# Patient Record
Sex: MALE | Race: WHITE | Employment: OTHER | URBAN - METROPOLITAN AREA
[De-identification: names, ages, dates, MRNs, and addresses within clinical notes are randomized per-mention and may not be internally consistent; named-entity substitution may affect disease eponyms.]

---

## 2017-03-07 ENCOUNTER — OFFICE VISIT (OUTPATIENT)
Dept: INTERNAL MEDICINE CLINIC | Age: 82
End: 2017-03-07

## 2017-03-07 ENCOUNTER — HOSPITAL ENCOUNTER (OUTPATIENT)
Dept: LAB | Age: 82
Discharge: HOME | End: 2017-03-07
Payer: MEDICARE

## 2017-03-07 VITALS
DIASTOLIC BLOOD PRESSURE: 80 MMHG | BODY MASS INDEX: 28.73 KG/M2 | SYSTOLIC BLOOD PRESSURE: 134 MMHG | HEIGHT: 69 IN | HEART RATE: 88 BPM | WEIGHT: 194 LBS | OXYGEN SATURATION: 99 % | RESPIRATION RATE: 16 BRPM | TEMPERATURE: 99.2 F

## 2017-03-07 DIAGNOSIS — I10 ESSENTIAL HYPERTENSION: ICD-10-CM

## 2017-03-07 DIAGNOSIS — E78.5 DYSLIPIDEMIA: ICD-10-CM

## 2017-03-07 DIAGNOSIS — Z00.00 ROUTINE GENERAL MEDICAL EXAMINATION AT A HEALTH CARE FACILITY: Primary | ICD-10-CM

## 2017-03-07 DIAGNOSIS — F32.A DEPRESSIVE DISORDER: ICD-10-CM

## 2017-03-07 LAB
ALBUMIN SERPL BCP-MCNC: 4.1 G/DL (ref 3.4–5)
ALBUMIN/GLOB SERPL: 1.5 {RATIO} (ref 0.8–1.7)
ALP SERPL-CCNC: 71 U/L (ref 45–117)
ALT SERPL-CCNC: 19 U/L (ref 16–61)
ANION GAP BLD CALC-SCNC: 8 MMOL/L (ref 3–18)
AST SERPL W P-5'-P-CCNC: 15 U/L (ref 15–37)
BASOPHILS # BLD AUTO: 0 K/UL (ref 0–0.06)
BASOPHILS # BLD: 0 % (ref 0–2)
BILIRUB SERPL-MCNC: 0.9 MG/DL (ref 0.2–1)
BUN SERPL-MCNC: 27 MG/DL (ref 7–18)
BUN/CREAT SERPL: 18 (ref 12–20)
CALCIUM SERPL-MCNC: 8.8 MG/DL (ref 8.5–10.1)
CHLORIDE SERPL-SCNC: 105 MMOL/L (ref 100–108)
CHOLEST SERPL-MCNC: 146 MG/DL
CO2 SERPL-SCNC: 28 MMOL/L (ref 21–32)
CREAT SERPL-MCNC: 1.5 MG/DL (ref 0.6–1.3)
DIFFERENTIAL METHOD BLD: ABNORMAL
EOSINOPHIL # BLD: 0 K/UL (ref 0–0.4)
EOSINOPHIL NFR BLD: 0 % (ref 0–5)
ERYTHROCYTE [DISTWIDTH] IN BLOOD BY AUTOMATED COUNT: 13.2 % (ref 11.6–14.5)
GLOBULIN SER CALC-MCNC: 2.8 G/DL (ref 2–4)
GLUCOSE SERPL-MCNC: 91 MG/DL (ref 74–99)
HCT VFR BLD AUTO: 41.9 % (ref 36–48)
HDLC SERPL-MCNC: 54 MG/DL (ref 40–60)
HDLC SERPL: 2.7 {RATIO} (ref 0–5)
HGB BLD-MCNC: 13.6 G/DL (ref 13–16)
LDLC SERPL CALC-MCNC: 73.2 MG/DL (ref 0–100)
LIPID PROFILE,FLP: NORMAL
LYMPHOCYTES # BLD AUTO: 15 % (ref 21–52)
LYMPHOCYTES # BLD: 1.2 K/UL (ref 0.9–3.6)
MCH RBC QN AUTO: 30.7 PG (ref 24–34)
MCHC RBC AUTO-ENTMCNC: 32.5 G/DL (ref 31–37)
MCV RBC AUTO: 94.6 FL (ref 74–97)
MONOCYTES # BLD: 0.3 K/UL (ref 0.05–1.2)
MONOCYTES NFR BLD AUTO: 4 % (ref 3–10)
NEUTS SEG # BLD: 6.4 K/UL (ref 1.8–8)
NEUTS SEG NFR BLD AUTO: 81 % (ref 40–73)
PLATELET # BLD AUTO: 95 K/UL (ref 135–420)
PMV BLD AUTO: 12.3 FL (ref 9.2–11.8)
POTASSIUM SERPL-SCNC: 3.6 MMOL/L (ref 3.5–5.5)
PROT SERPL-MCNC: 6.9 G/DL (ref 6.4–8.2)
RBC # BLD AUTO: 4.43 M/UL (ref 4.7–5.5)
SODIUM SERPL-SCNC: 141 MMOL/L (ref 136–145)
TRIGL SERPL-MCNC: 94 MG/DL (ref ?–150)
VLDLC SERPL CALC-MCNC: 18.8 MG/DL
WBC # BLD AUTO: 7.9 K/UL (ref 4.6–13.2)

## 2017-03-07 PROCEDURE — 80061 LIPID PANEL: CPT | Performed by: INTERNAL MEDICINE

## 2017-03-07 PROCEDURE — 85025 COMPLETE CBC W/AUTO DIFF WBC: CPT | Performed by: INTERNAL MEDICINE

## 2017-03-07 PROCEDURE — 80053 COMPREHEN METABOLIC PANEL: CPT | Performed by: INTERNAL MEDICINE

## 2017-03-07 RX ORDER — CITALOPRAM 20 MG/1
TABLET, FILM COATED ORAL DAILY
COMMUNITY

## 2017-03-07 RX ORDER — METOPROLOL TARTRATE 25 MG/1
TABLET, FILM COATED ORAL 2 TIMES DAILY
COMMUNITY

## 2017-03-07 NOTE — PROGRESS NOTES
1. Have you been to the ER, urgent care clinic since your last visit? Hospitalized since your last visit? No    2. Have you seen or consulted any other health care providers outside of the [address] [place] [address] since your last visit? Include any pap smears or colon screening.  No

## 2017-03-07 NOTE — PATIENT INSTRUCTIONS
DASH Diet: Care Instructions  Your Care Instructions  The DASH diet is an eating plan that can help lower your blood pressure. DASH stands for Dietary Approaches to Stop Hypertension. Hypertension is high blood pressure. The DASH diet focuses on eating foods that are high in calcium, potassium, and magnesium. These nutrients can lower blood pressure. The foods that are highest in these nutrients are fruits, vegetables, low-fat dairy products, nuts, seeds, and legumes. But taking calcium, potassium, and magnesium supplements instead of eating foods that are high in those nutrients does not have the same effect. The DASH diet also includes whole grains, fish, and poultry. The DASH diet is one of several lifestyle changes your doctor may recommend to lower your high blood pressure. Your doctor may also want you to decrease the amount of sodium in your diet. Lowering sodium while following the DASH diet can lower blood pressure even further than just the DASH diet alone. Follow-up care is a key part of your treatment and safety. Be sure to make and go to all appointments, and call your doctor if you are having problems. It's also a good idea to know your test results and keep a list of the medicines you take. How can you care for yourself at home? Following the DASH diet  · Eat 4 to 5 servings of fruit each day. A serving is 1 medium-sized piece of fruit, ½ cup chopped or canned fruit, 1/4 cup dried fruit, or 4 ounces (½ cup) of fruit juice. Choose fruit more often than fruit juice. · Eat 4 to 5 servings of vegetables each day. A serving is 1 cup of lettuce or raw leafy vegetables, ½ cup of chopped or cooked vegetables, or 4 ounces (½ cup) of vegetable juice. Choose vegetables more often than vegetable juice. · Get 2 to 3 servings of low-fat and fat-free dairy each day. A serving is 8 ounces of milk, 1 cup of yogurt, or 1 ½ ounces of cheese. · Eat 6 to 8 servings of grains each day.  A serving is 1 slice of bread, 1 ounce of dry cereal, or ½ cup of cooked rice, pasta, or cooked cereal. Try to choose whole-grain products as much as possible. · Limit lean meat, poultry, and fish to 2 servings each day. A serving is 3 ounces, about the size of a deck of cards. · Eat 4 to 5 servings of nuts, seeds, and legumes (cooked dried beans, lentils, and split peas) each week. A serving is 1/3 cup of nuts, 2 tablespoons of seeds, or ½ cup of cooked beans or peas. · Limit fats and oils to 2 to 3 servings each day. A serving is 1 teaspoon of vegetable oil or 2 tablespoons of salad dressing. · Limit sweets and added sugars to 5 servings or less a week. A serving is 1 tablespoon jelly or jam, ½ cup sorbet, or 1 cup of lemonade. · Eat less than 2,300 milligrams (mg) of sodium a day. If you limit your sodium to 1,500 mg a day, you can lower your blood pressure even more. Tips for success  · Start small. Do not try to make dramatic changes to your diet all at once. You might feel that you are missing out on your favorite foods and then be more likely to not follow the plan. Make small changes, and stick with them. Once those changes become habit, add a few more changes. · Try some of the following:  ¨ Make it a goal to eat a fruit or vegetable at every meal and at snacks. This will make it easy to get the recommended amount of fruits and vegetables each day. ¨ Try yogurt topped with fruit and nuts for a snack or healthy dessert. ¨ Add lettuce, tomato, cucumber, and onion to sandwiches. ¨ Combine a ready-made pizza crust with low-fat mozzarella cheese and lots of vegetable toppings. Try using tomatoes, squash, spinach, broccoli, carrots, cauliflower, and onions. ¨ Have a variety of cut-up vegetables with a low-fat dip as an appetizer instead of chips and dip. ¨ Sprinkle sunflower seeds or chopped almonds over salads. Or try adding chopped walnuts or almonds to cooked vegetables. ¨ Try some vegetarian meals using beans and peas. Add garbanzo or kidney beans to salads. Make burritos and tacos with mashed ewing beans or black beans. Where can you learn more? Go to http://leela-estela.info/. Enter B249 in the search box to learn more about \"DASH Diet: Care Instructions. \"  Current as of: March 23, 2016  Content Version: 11.1  © 4873-6246 [employer]. Care instructions adapted under license by [employer] (which disclaims liability or warranty for this information). If you have questions about a medical condition or this instruction, always ask your healthcare professional. [place] [address] any warranty or liability for your use of this information. Medicare Wellness Visit, Male    The best way to live healthy is to have a healthy lifestyle by eating a well-balanced diet, exercising regularly, limiting alcohol and stopping smoking. Regular physical exams and screening tests are another way to keep healthy. Preventive exams provided by your health care provider can find health problems before they become diseases or illnesses. Preventive services including immunizations, screening tests, monitoring and exams can help you take care of your own health. All people over age 72 should have a pneumovax  and and a prevnar shot to prevent pneumonia. These are once in a lifetime unless you and your provider decide differently. All people over 65 should have a yearly flu shot and a tetanus vaccine every 10 years. Screening for diabetes mellitus with a blood sugar test should be done every year. Glaucoma is a disease of the eye due to increased ocular pressure that can lead to blindness and it should be done every year by an eye professional.    Cardiovascular screening tests that check for elevated lipids (fatty part of blood) which can lead to heart disease and strokes should be done every 5 years.     Colorectal screening that evaluates for blood or polyps in your colon should be done yearly as a stool test or every five years as a flexible sigmoidoscope or every 10 years as a colonoscopy up to age 76. Men up to age 76 may need a screening blood test for prostate cancer at certain intervals, depending on their personal and family history. This decision is between the patient and his provider. If you have been a smoker or had family history of abdominal aortic aneurysms, you and your provider may decide to schedule an ultrasound test of your aorta. Hepatitis C screening is also recommended for anyone born between [birth year] through [address]. A shingles vaccine is also recommended once in a lifetime after age 61. Your Medicare Wellness Exam is recommended annually.     Here is a list of your current Health Maintenance items with a due date:  Health Maintenance Due   Topic Date Due    DTaP/Tdap/Td  (1 - Tdap) [birth year]/1953    Shingles Vaccine  04/11/1992    Glaucoma Screening   04/11/1997    Annual Well Visit  11/17/2016

## 2017-03-07 NOTE — MR AVS SNAPSHOT
Visit Information Date & Time Provider Department Dept. Phone Encounter #  
 3/7/2017 12:00 PM Tony Kelly MD [facility] INTERNAL MEDICINE OF [place] [phone] [id number] Follow-up Instructions Return if symptoms worsen or fail to improve. Follow-up and Disposition History Your Appointments 9/8/2017 12:00 PM  
Follow Up with Tony Kelly MD  
[facility] INTERNAL MEDICINE OF [place] [address] [facility]) Appt Note: 6 mo f/u  
 [address], [address] [facility] [address]  
  
   
 [address] [facility], [address] [facility] [address] Upcoming Health Maintenance Date Due DTaP/Tdap/Td series (1 - Tdap) 4[birth year]11/1953 ZOSTER VACCINE AGE 60> 4/11/1992 GLAUCOMA SCREENING Q2Y 4/11/1997 MEDICARE YEARLY EXAM 3/8/2018 Allergies as of 3/7/2017  Review Complete On: 3/7/2017 By: Tony Kelly MD  
  
 Severity Noted Reaction Type Reactions Sulfa (Sulfonamide Antibiotics)  01/27/2011    Other (comments) Current Immunizations  Reviewed on 2/4/2016 Name Date Influenza High Dose Vaccine PF 9/9/2016, 9/18/2015 Pneumococcal Conjugate (PCV-13) 1/2/2003 Pneumococcal Vaccine (Unspecified Type) 1/12/2012 Not reviewed this visit You Were Diagnosed With   
  
 Codes Comments Routine general medical examination at a health care facility    -  Primary ICD-10-CM: Z00.00 ICD-9-CM: V70.0 Essential hypertension     ICD-10-CM: I10 
ICD-9-CM: 401.9 Dyslipidemia     ICD-10-CM: E78.5 ICD-9-CM: 272.4 Depressive disorder     ICD-10-CM: F32.9 ICD-9-CM: 686 Vitals BP Pulse Temp Resp Height(growth percentile) Weight(growth percentile) 134/80 88 99.2 °F (37.3 °C) (Tympanic) 16 5' 9\" (1.753 m) 194 lb (88 kg) SpO2 BMI Smoking Status 99% 28.65 kg/m2 Former Smoker Vitals History BMI and BSA Data  Body Mass Index Body Surface Area  
 28.65 kg/m 2 2.07 m 2  
  
  
 Preferred Pharmacy Pharmacy Name Phone [id number] Daniel [phone] Your Updated Medication List  
  
   
This list is accurate as of: 3/7/17 12:26 PM.  Always use your most recent med list.  
  
  
  
  
 aspirin delayed-release 81 mg tablet Take 1 Tab by mouth daily. CeleXA 20 mg tablet Generic drug:  citalopram  
Take  by mouth daily. fluticasone 50 mcg/actuation nasal spray Commonly known as:  FLONASE  
use in each nostril  
  
 guaiFENesin-codeine 100-10 mg/5 mL solution Commonly known as:  CHERATUSSIN AC  
1 or 2 tsp q 6 hrs prn cough  
  
 losartan-hydroCHLOROthiazide 50-12.5 mg per tablet Commonly known as:  HYZAAR Take 1 Tab by mouth daily. meclizine 25 mg tablet Commonly known as:  ANTIVERT Take 1 Tab by mouth three (3) times daily as needed for Dizziness. metoprolol tartrate 25 mg tablet Commonly known as:  LOPRESSOR Take  by mouth two (2) times a day. simvastatin 20 mg tablet Commonly known as:  ZOCOR Take 1 Tab by mouth nightly. traMADol 50 mg tablet Commonly known as:  ULTRAM  
1 or 2 po tid prn pain XALATAN 0.005 % ophthalmic solution Generic drug:  latanoprost  
Administer 1 Drop to both eyes nightly. Follow-up Instructions Return if symptoms worsen or fail to improve. Patient Instructions DASH Diet: Care Instructions Your Care Instructions The DASH diet is an eating plan that can help lower your blood pressure. DASH stands for Dietary Approaches to Stop Hypertension. Hypertension is high blood pressure. The DASH diet focuses on eating foods that are high in calcium, potassium, and magnesium. These nutrients can lower blood pressure. The foods that are highest in these nutrients are fruits, vegetables, low-fat dairy products, nuts, seeds, and legumes.  But taking calcium, potassium, and magnesium supplements instead of eating foods that are high in those nutrients does not have the same effect. The DASH diet also includes whole grains, fish, and poultry. The DASH diet is one of several lifestyle changes your doctor may recommend to lower your high blood pressure. Your doctor may also want you to decrease the amount of sodium in your diet. Lowering sodium while following the DASH diet can lower blood pressure even further than just the DASH diet alone. Follow-up care is a key part of your treatment and safety. Be sure to make and go to all appointments, and call your doctor if you are having problems. It's also a good idea to know your test results and keep a list of the medicines you take. How can you care for yourself at home? Following the DASH diet · Eat 4 to 5 servings of fruit each day. A serving is 1 medium-sized piece of fruit, ½ cup chopped or canned fruit, 1/4 cup dried fruit, or 4 ounces (½ cup) of fruit juice. Choose fruit more often than fruit juice. · Eat 4 to 5 servings of vegetables each day. A serving is 1 cup of lettuce or raw leafy vegetables, ½ cup of chopped or cooked vegetables, or 4 ounces (½ cup) of vegetable juice. Choose vegetables more often than vegetable juice. · Get 2 to 3 servings of low-fat and fat-free dairy each day. A serving is 8 ounces of milk, 1 cup of yogurt, or 1 ½ ounces of cheese. · Eat 6 to 8 servings of grains each day. A serving is 1 slice of bread, 1 ounce of dry cereal, or ½ cup of cooked rice, pasta, or cooked cereal. Try to choose whole-grain products as much as possible. · Limit lean meat, poultry, and fish to 2 servings each day. A serving is 3 ounces, about the size of a deck of cards. · Eat 4 to 5 servings of nuts, seeds, and legumes (cooked dried beans, lentils, and split peas) each week. A serving is 1/3 cup of nuts, 2 tablespoons of seeds, or ½ cup of cooked beans or peas. · Limit fats and oils to 2 to 3 servings each day. A serving is 1 teaspoon of vegetable oil or 2 tablespoons of salad dressing. · Limit sweets and added sugars to 5 servings or less a week. A serving is 1 tablespoon jelly or jam, ½ cup sorbet, or 1 cup of lemonade. · Eat less than 2,300 milligrams (mg) of sodium a day. If you limit your sodium to 1,500 mg a day, you can lower your blood pressure even more. Tips for success · Start small. Do not try to make dramatic changes to your diet all at once. You might feel that you are missing out on your favorite foods and then be more likely to not follow the plan. Make small changes, and stick with them. Once those changes become habit, add a few more changes. · Try some of the following: ¨ Make it a goal to eat a fruit or vegetable at every meal and at snacks. This will make it easy to get the recommended amount of fruits and vegetables each day. ¨ Try yogurt topped with fruit and nuts for a snack or healthy dessert. ¨ Add lettuce, tomato, cucumber, and onion to sandwiches. ¨ Combine a ready-made pizza crust with low-fat mozzarella cheese and lots of vegetable toppings. Try using tomatoes, squash, spinach, broccoli, carrots, cauliflower, and onions. ¨ Have a variety of cut-up vegetables with a low-fat dip as an appetizer instead of chips and dip. ¨ Sprinkle sunflower seeds or chopped almonds over salads. Or try adding chopped walnuts or almonds to cooked vegetables. ¨ Try some vegetarian meals using beans and peas. Add garbanzo or kidney beans to salads. Make burritos and tacos with mashed ewing beans or black beans. Where can you learn more? Go to http://leela-estela.info/. Enter C823 in the search box to learn more about \"DASH Diet: Care Instructions. \" Current as of: March 23, 2016 Content Version: 11.1 © 9824-4293 [employer].  Care instructions adapted under license by [address] (which disclaims liability or warranty for this information). If you have questions about a medical condition or this instruction, always ask your healthcare professional. [address] any warranty or liability for your use of this information. Medicare Wellness Visit, Male The best way to live healthy is to have a healthy lifestyle by eating a well-balanced diet, exercising regularly, limiting alcohol and stopping smoking. Regular physical exams and screening tests are another way to keep healthy. Preventive exams provided by your health care provider can find health problems before they become diseases or illnesses. Preventive services including immunizations, screening tests, monitoring and exams can help you take care of your own health. All people over age 72 should have a pneumovax  and and a prevnar shot to prevent pneumonia. These are once in a lifetime unless you and your provider decide differently. All people over 65 should have a yearly flu shot and a tetanus vaccine every 10 years. Screening for diabetes mellitus with a blood sugar test should be done every year. Glaucoma is a disease of the eye due to increased ocular pressure that can lead to blindness and it should be done every year by an eye professional. 
 
Cardiovascular screening tests that check for elevated lipids (fatty part of blood) which can lead to heart disease and strokes should be done every 5 years. Colorectal screening that evaluates for blood or polyps in your colon should be done yearly as a stool test or every five years as a flexible sigmoidoscope or every 10 years as a colonoscopy up to age 76. Men up to age 76 may need a screening blood test for prostate cancer at certain intervals, depending on their personal and family history. This decision is between the patient and his provider. If you have been a smoker or had family history of abdominal aortic aneurysms, you and your provider may decide to schedule an ultrasound test of your aorta. Hepatitis C screening is also recommended for anyone born between [birth year] through [address]. A shingles vaccine is also recommended once in a lifetime after age 61. Your Medicare Wellness Exam is recommended annually. Here is a list of your current Health Maintenance items with a due date: 
Health Maintenance Due Topic Date Due  
 DTaP/Tdap/Td  (1 - Tdap) 04[birth year]11/1953  Shingles Vaccine  04/11/1992  Glaucoma Screening   04/11/1997 [facility] Annual Well Visit  11/17/2016 Patient Instructions History Introducing [address]! Nikita Garcia introduces [employer] patient portal. Now you can access parts of your medical record, email your doctor's office, and request medication refills online. 1. In your internet browser, go to [email or web address] 2. Click on the First Time User? Click Here link in the Sign In box. You will see the New Member Sign Up page. 3. Enter your [employer] Access Code exactly as it appears below. You will not need to use this code after youve completed the sign-up process. If you do not sign up before the expiration date, you must request a new code. · [employer] Access Code: [email or web address] Expires: 6/5/2017 12:26 PM 
 
4. Enter the last four digits of your Social Security Number (xxxx) and Date of Birth (mm/dd/yyyy) as indicated and click Submit. You will be taken to the next sign-up page. 5. Create a [employer] ID. This will be your [employer] login ID and cannot be changed, so think of one that is secure and easy to remember. 6. Create a [employer] password. You can change your password at any time. 7. Enter your Password Reset Question and Answer. This can be used at a later time if you forget your password. 8. Enter your e-mail address.  You will receive e-mail notification when new information is available in [employer]. 9. Click Sign Up. You can now view and download portions of your medical record. 10. Click the Download Summary menu link to download a portable copy of your medical information. If you have questions, please visit the Frequently Asked Questions section of the [employer] website. Remember, [employer] is NOT to be used for urgent needs. For medical emergencies, dial 911. Now available from your iPhone and Android! Please provide this summary of care documentation to your next provider. Your primary care clinician is listed as Aleksandra Tadeo. If you have any questions after today's visit, please call [phone].

## 2017-03-07 NOTE — PROGRESS NOTES
This is a Subsequent Medicare Annual Wellness Visit providing Personalized Prevention Plan Services (PPPS) (Performed 12 months after initial AWV and PPPS )    I have reviewed the patient's medical history in detail and updated the computerized patient record. History     Past Medical History:   Diagnosis Date    Abnormal myocardial perfusion study 04/12/2012    Small, mild mid to distal anterior septal defect concerning for ischemia. Mild mid to distal anterior septal hypk. EF 61%. Neg EKG on pharm stress test.  Low to intermediate risk.  Arthritis     CAD (coronary artery disease) December 2009    presented with anterior wall STEMI with subsequent 100% proximal LAD thrombotic lesion stented to residual 0% using 3.5-mm Cypher stent (13-mm length). He only had mild disease in his left main, RCA, and circumflex coronary artery.  Chronic kidney disease     Depressive disorder     Dyslipidemia     Gout     History of echocardiogram 06/25/2013    EF 55-60%. No RWMA. Normal diastolic function. Sm pericardial effusion (also noted on echo 4/26/10), without tamponade physiology.  Hypercoagulable state ([address]     DVT x 2 (L) 2/2014     Hypertension     Hypertrophy (benign) of prostate     Lower extremity venous duplex 07/05/2013    Right leg:  Acute, occlusive DVT in posterior tibial vein. Pulsatile flow.  Neuropathy     Normal ankle brachial index 01/16/2012    No significant peripheral arterial disease bilaterally. R BHANU 1.31.  L BHANU 1.31.    Pacemaker 04/27/10    Implantation of dual-chamber Medtronic pacemaker    S/P cardiac cath 12/28/2009    oRCA 30%. LM patent. CX patent. LAD p100% thrombotic (Pronto thrombectomy, 3.5 x 13 Cypher stent). LVEDP 26.  EF 60%. Min anteroapical hypk.       Sinus bradycardia     pacemaker in 2010    Thromboembolus [facility]) 2011    h/o DVT x2 to LLE during surgery for Right TKR    Venous (peripheral) insufficiency       Past Surgical History: Procedure Laterality Date    HX COLONOSCOPY  11/2004    neg    HX HEART CATHETERIZATION  12/28/09    LAD stent for AMI 3.5mm Cypher stent (13mm length)    HX ORTHOPAEDIC      shoulder manipulation    HX ORTHOPAEDIC  6/2013    right knee replacement    HX PACEMAKER  04/27/10    Implantation of dual-chamber Medtronic pacemaker     Current Outpatient Prescriptions   Medication Sig Dispense Refill    citalopram (CELEXA) 20 mg tablet Take  by mouth daily.  metoprolol tartrate (LOPRESSOR) 25 mg tablet Take  by mouth two (2) times a day.  guaiFENesin-codeine (CHERATUSSIN AC) 100-10 mg/5 mL solution 1 or 2 tsp q 6 hrs prn cough 120 mL 1    losartan-hydrochlorothiazide (HYZAAR) 50-12.5 mg per tablet Take 1 Tab by mouth daily. 30 Tab 10    traMADol (ULTRAM) 50 mg tablet 1 or 2 po tid prn pain 40 Tab 1    aspirin delayed-release 81 mg tablet Take 1 Tab by mouth daily. 30 Tab 0    fluticasone (FLONASE) 50 mcg/actuation nasal spray use in each nostril (Patient taking differently: 2 Sprays by Both Nostrils route daily as needed. use in each nostril) 1 Bottle 0    meclizine (ANTIVERT) 25 mg tablet Take 1 Tab by mouth three (3) times daily as needed for Dizziness. 30 Tab 0    simvastatin (ZOCOR) 20 mg tablet Take 1 Tab by mouth nightly. 90 Tab 3    latanoprost (XALATAN) 0.005 % ophthalmic solution Administer 1 Drop to both eyes nightly.        Allergies   Allergen Reactions    Sulfa (Sulfonamide Antibiotics) Other (comments)     Family History   Problem Relation Age of Onset    Arthritis-osteo Father      Social History   Substance Use Topics    Smoking status: Former Smoker     Packs/day: 1.00     Quit date: 5/21/1955    Smokeless tobacco: Never Used    Alcohol use No     Patient Active Problem List   Diagnosis Code    CAD (coronary artery disease) I25.10    Sinus bradycardia R00.1    Gout 200    Hypertension I10    S/P cardiac catheterization Z98.890    Dyslipidemia E78.5    Pacemaker Z95.0    Old myocardial infarction I25.2    Hypertrophy (benign) of prostate N40.0    Osteoarthrosis involving multiple sites M15.9    Depressive disorder F32.9    Chronic kidney disease N18.9    Venous (peripheral) insufficiency I87.2    Hypercoagulable state ([facility] [address] D68.59    Advance directive discussed with patient Z70.80    Syncope R55    Orthostatic dizziness R42    Fall at home W19. [place], Y92.099       Depression Risk Factor Screening:   No flowsheet data found. Alcohol Risk Factor Screening: On any occasion during the past 3 months, have you had more than 4 drinks containing alcohol? No    Do you average more than 14 drinks per week? No      Functional Ability and Level of Safety:     Hearing Loss   moderate    Activities of Daily Living   Partial assistance. Requires assistance with: ambulation    Fall Risk     Fall Risk Assessment, last 12 mths 7/8/2016   Able to walk? Yes   Fall in past 12 months? No   Fall with injury? -   Number of falls in past 12 months -   Fall Risk Score -     Abuse Screen   Patient is not abused    Review of Systems   A comprehensive review of systems was negative except for that written in the HPI. Physical Examination     Evaluation of Cognitive Function:  Mood/affect:  neutral  Appearance: age appropriate  Family member/caregiver input: none    heavy WN in NAD  Visit Vitals    /80    Pulse 88    Temp 99.2 °F (37.3 °C) (Tympanic)    Resp 16    Ht 5' 9\" (1.753 m)    Wt 194 lb (88 kg)    SpO2 99%    BMI 28.65 kg/m2     OP: clear  Neck: supple w/o mass or bruits  Chest: clear  CV: RRR w/o m,r,g; pulses NP distal legs  Abd: +BS, soft, NT w/o mass or HSM  Ext: tr edema  Neuro: NF      Patient Care Team:  Genoveva Cavazos MD as PCP - General (Internal Medicine)    Advice/Referrals/Counseling   Education and counseling provided:  Are appropriate based on today's review and evaluation      Assessment/Plan     Encounter Diagnoses   Name Primary?     Routine general medical examination at a health care facility Yes    Essential hypertension     Dyslipidemia     Depressive disorder    Medicare wellness performed  Labs soon to assess metabolic parameters  Continue dietary/exercise efforts  Vaccines: flu vaccine already given  Advance directive discussed    PROGRESS NOTE  HPI:   Routine f/u of HTN, hyperlipidemia, depression (mild on rx)  w/o chest pain/abd. discomfort; no increased dyspnea, cough or pedal edema; denies constitutional complaints of fever, night sweats or wt loss; no evidence of GI/ hemorrhage; no polyuria/polydipsia. Activity is sedentary d/t OA pain    ROS is otherwise negative. Past Medical History:   Diagnosis Date    Abnormal myocardial perfusion study 04/12/2012    Small, mild mid to distal anterior septal defect concerning for ischemia. Mild mid to distal anterior septal hypk. EF 61%. Neg EKG on pharm stress test.  Low to intermediate risk.  Arthritis     CAD (coronary artery disease) December 2009    presented with anterior wall STEMI with subsequent 100% proximal LAD thrombotic lesion stented to residual 0% using 3.5-mm Cypher stent (13-mm length). He only had mild disease in his left main, RCA, and circumflex coronary artery.  Chronic kidney disease     Depressive disorder     Dyslipidemia     Gout     History of echocardiogram 06/25/2013    EF 55-60%. No RWMA. Normal diastolic function. Sm pericardial effusion (also noted on echo 4/26/10), without tamponade physiology.  Hypercoagulable state ([address]     DVT x 2 (L) 2/2014     Hypertension     Hypertrophy (benign) of prostate     Lower extremity venous duplex 07/05/2013    Right leg:  Acute, occlusive DVT in posterior tibial vein. Pulsatile flow.  Neuropathy     Normal ankle brachial index 01/16/2012    No significant peripheral arterial disease bilaterally.   R BHANU 1.31.  L BHANU 1.31.    Pacemaker 04/27/10    Implantation of dual-chamber Medtronic pacemaker    S/P cardiac cath 12/28/2009    oRCA 30%. LM patent. CX patent. LAD p100% thrombotic (Pronto thrombectomy, 3.5 x 13 Cypher stent). LVEDP 26.  EF 60%. Min anteroapical hypk.  Sinus bradycardia     pacemaker in 2010    Thromboembolus [facility]) 2011    h/o DVT x2 to LLE during surgery for Right TKR    Venous (peripheral) insufficiency        Past Surgical History:   Procedure Laterality Date    HX COLONOSCOPY  11/2004    neg    HX HEART CATHETERIZATION  12/28/09    LAD stent for AMI 3.5mm Cypher stent (13mm length)    HX ORTHOPAEDIC      shoulder manipulation    HX ORTHOPAEDIC  6/2013    right knee replacement    HX PACEMAKER  04/27/10    Implantation of dual-chamber Medtronic pacemaker       Social History     Social History    Marital status:      Spouse name: N/A    Number of children: N/A    Years of education: N/A     Occupational History    retired wood worker      Social History Main Topics    Smoking status: Former Smoker     Packs/day: 1.00     Quit date: 5/21/1955    Smokeless tobacco: Never Used    Alcohol use No    Drug use: No    Sexual activity: Yes     Partners: Female     Birth control/ protection: None     Other Topics Concern    Not on file     Social History Narrative       Allergies   Allergen Reactions    Sulfa (Sulfonamide Antibiotics) Other (comments)       Family History   Problem Relation Age of Onset    Arthritis-osteo Father        Current Outpatient Prescriptions   Medication Sig Dispense Refill    citalopram (CELEXA) 20 mg tablet Take  by mouth daily.  metoprolol tartrate (LOPRESSOR) 25 mg tablet Take  by mouth two (2) times a day.  guaiFENesin-codeine (CHERATUSSIN AC) 100-10 mg/5 mL solution 1 or 2 tsp q 6 hrs prn cough 120 mL 1    losartan-hydrochlorothiazide (HYZAAR) 50-12.5 mg per tablet Take 1 Tab by mouth daily.  30 Tab 10    traMADol (ULTRAM) 50 mg tablet 1 or 2 po tid prn pain 40 Tab 1    aspirin delayed-release 81 mg tablet Take 1 Tab by mouth daily. 30 Tab 0    fluticasone (FLONASE) 50 mcg/actuation nasal spray use in each nostril (Patient taking differently: 2 Sprays by Both Nostrils route daily as needed. use in each nostril) 1 Bottle 0    meclizine (ANTIVERT) 25 mg tablet Take 1 Tab by mouth three (3) times daily as needed for Dizziness. 30 Tab 0    simvastatin (ZOCOR) 20 mg tablet Take 1 Tab by mouth nightly. 90 Tab 3    latanoprost (XALATAN) 0.005 % ophthalmic solution Administer 1 Drop to both eyes nightly. Visit Vitals    /80    Pulse 88    Temp 99.2 °F (37.3 °C) (Tympanic)    Resp 16    Ht 5' 9\" (1.753 m)    Wt 194 lb (88 kg)    SpO2 99%    BMI 28.65 kg/m2       PE  Well nourished in NAD  HEENT:  OP: clear. Neck: supple w/o mass or bruits. Chest: clear. CV: RRR w/o m,r,g; pulses NP distal legs  Abd: soft, NT, w/o HSM or mass. Ext: tr edema. Neuro: NF. Assessment and Plan    Encounter Diagnoses   Name Primary?     Routine general medical examination at a health care facility Yes    Essential hypertension     Dyslipidemia     Depressive disorder    HTN - controlled  Hyperlipidemia - labs soon to assess metabolic parameters  Depression - mild sxs on celexa  No change in rx  OV 6 mos or prn  I have explained plan to patient and the patient verbalizes understanding

## 2017-05-29 ENCOUNTER — APPOINTMENT (OUTPATIENT)
Dept: GENERAL RADIOLOGY | Age: 82
End: 2017-05-29
Attending: EMERGENCY MEDICINE
Payer: MEDICARE

## 2017-05-29 ENCOUNTER — HOSPITAL ENCOUNTER (EMERGENCY)
Age: 82
Discharge: HOME | End: 2017-05-29
Attending: EMERGENCY MEDICINE
Payer: MEDICARE

## 2017-05-29 ENCOUNTER — APPOINTMENT (OUTPATIENT)
Dept: CT IMAGING | Age: 82
End: 2017-05-29
Attending: EMERGENCY MEDICINE
Payer: MEDICARE

## 2017-05-29 VITALS
RESPIRATION RATE: 16 BRPM | DIASTOLIC BLOOD PRESSURE: 85 MMHG | OXYGEN SATURATION: 99 % | HEART RATE: 63 BPM | TEMPERATURE: 98.4 F | SYSTOLIC BLOOD PRESSURE: 130 MMHG

## 2017-05-29 DIAGNOSIS — T14.8XXA ABRASION: ICD-10-CM

## 2017-05-29 DIAGNOSIS — S51.011A SKIN TEAR OF ELBOW WITHOUT COMPLICATION, RIGHT, INITIAL ENCOUNTER: ICD-10-CM

## 2017-05-29 DIAGNOSIS — S09.90XA CHI (CLOSED HEAD INJURY), INITIAL ENCOUNTER: ICD-10-CM
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
## 2017-05-29 PROCEDURE — 72125 CT NECK SPINE W/O DYE: CPT

## 2017-05-29 PROCEDURE — 70450 CT HEAD/BRAIN W/O DYE: CPT

## 2017-05-29 PROCEDURE — 99283 EMERGENCY DEPT VISIT LOW MDM: CPT

## 2017-05-29 PROCEDURE — 74011250637 HC RX REV CODE- 250/637: Performed by: EMERGENCY MEDICINE

## 2017-05-29 PROCEDURE — 73562 X-RAY EXAM OF KNEE 3: CPT

## 2017-05-29 RX ORDER — ACETAMINOPHEN 325 MG/1
650 TABLET ORAL
Status: COMPLETED | OUTPATIENT
Start: 2017-05-29 | End: 2017-05-29

## 2017-05-29 RX ADMIN — ACETAMINOPHEN 650 MG: 325 TABLET ORAL at 17:20

## 2017-05-29 NOTE — DISCHARGE INSTRUCTIONS
Cuts Closed With Adhesives: Care Instructions  Your Care Instructions  A cut can happen anywhere on your body. The doctor used an adhesive to close the cut. When the adhesive dries, it forms a film that holds the edges of the cut together. Skin adhesives are sometimes called liquid stitches. If the cut went deep and through the skin, the doctor may have put in a layer of stitches below the adhesive. The deeper layer of stitches brings the deep part of the cut together. These stitches will dissolve and don't need to be removed. You don't see the stitches, only the adhesive. You may have a bandage. The doctor has checked you carefully, but problems can develop later. If you notice any problems or new symptoms, get medical treatment right away. Follow-up care is a key part of your treatment and safety. Be sure to make and go to all appointments, and call your doctor if you are having problems. It's also a good idea to know your test results and keep a list of the medicines you take. How can you care for yourself at home? · Keep the cut dry for the first 24 to 48 hours. After this, you can shower if your doctor okays it. Pat the cut dry. · Don't soak the cut, such as in a bathtub. Your doctor will tell you when it's safe to get the cut wet. · If your doctor told you how to care for your cut, follow your doctor's instructions. If you did not get instructions, follow this general advice:  ¨ Do not put any kind of ointment, cream, or lotion over the area. This can make the adhesive fall off too soon. ¨ After the first 24 to 48 hours, wash around the cut with clean water 2 times a day. Do not use hydrogen peroxide or alcohol, which can slow healing. ¨ If the doctor told you to use a bandage, put on a new bandage after cleaning the cut or if the bandage gets wet or dirty. · Prop up the sore area on a pillow anytime you sit or lie down during the next 3 days. Try to keep it above the level of your heart. This will help reduce swelling. · Leave the skin adhesive on your skin until it falls off on its own. This may take 5 to 10 days. · Do not scratch, rub, or pick at the adhesive. · Do not put the sticky part of a bandage directly on the adhesive. · Avoid any activity that could cause your cut to reopen. · Be safe with medicines. Read and follow all instructions on the label. ¨ If the doctor gave you a prescription medicine for pain, take it as prescribed. ¨ If you are not taking a prescription pain medicine, ask your doctor if you can take an over-the-counter medicine. When should you call for help? Call your doctor now or seek immediate medical care if:  · You have new pain, or your pain gets worse. · The skin near the cut is cold or pale or changes color. · You have tingling, weakness, or numbness near the cut. · The cut starts to bleed. · You have trouble moving the area near the cut. · You have symptoms of infection, such as:  ¨ Increased pain, swelling, warmth, or redness around the cut. ¨ Red streaks leading from the cut. ¨ Pus draining from the cut. ¨ A fever. Watch closely for changes in your health, and be sure to contact your doctor if:  · The cut reopens. · You do not get better as expected. Where can you learn more? Go to http://leela-estela.info/. Enter P174 in the search box to learn more about \"Cuts Closed With Adhesives: Care Instructions. \"  Current as of: May 27, 2016  Content Version: 11.2  © 7634-2906 [employer]. Care instructions adapted under license by [employer] (which disclaims liability or warranty for this information). If you have questions about a medical condition or this instruction, always ask your healthcare professional. [place] [address] any warranty or liability for your use of this information. Closed Head Injury: After Your Visit  Your Care Instructions  You have had a head injury.  Often, people cannot remember what happened right before or right after a head injury. Some head injuries can make you pass out, or lose consciousness, for a few seconds or minutes right after the injury. You need to have someone watch you closely for the next 24 hours. Contact your regular doctor to discuss follow-up care. Follow-up care is a key part of your treatment and safety. Be sure to make and go to all appointments, and call your doctor if you are having problems. It's also a good idea to know your test results and keep a list of the medicines you take. How can you care for yourself at home? · Have another adult watch you closely for the next 24 hours. That person should check for signs that your head injury is getting worse. · Put ice or a cold pack on the sore area for 10 to 20 minutes at a time. Put a thin cloth between the ice and your skin. · Take an over-the-counter pain medicine, such as acetaminophen (Tylenol), ibuprofen (Advil, Motrin), or naproxen (Aleve). Read and follow all instructions on the label. · You may sleep. If your doctor tells you to, have another adult check you at the suggested times to make sure you are able to wake up, recognize the other adult, and act normally. · Take it easy for the next few days or longer if you are not feeling well. · Do not drink any alcohol for at least the next 24 hours. What is postconcussive syndrome? If you have had a mild concussion, you may have a mild headache or just feel \"not quite right. \" These symptoms are common and usually go away on their own over a few days to 4 weeks. Sometimes after a concussion you may feel as if you are not functioning as well as you did before the injury, and you may develop new symptoms. This is called postconcussive syndrome. You may:  · Have changes in your ability to solve problems, think, concentrate, or remember. · Have headaches.   · Have changes in your sleep patterns, such as not being able to sleep or sleeping all the time. · Have changes in your personality. · Lack interest in your daily activities. · Become easily angered or anxious for no clear reason. · Have changes in your sex drive. · Lose your sense of taste or smell. · Be dizzy, lightheaded, or unsteady and find it hard to stand or walk. When should you call for help? Call 911 anytime you think you may need emergency care. For example, call if:  · You have twitching, jerking, or a seizure. · You suddenly cannot walk or stand. · You passed out (lost consciousness). · You are confused, do not know where you are, or are very sleepy or hard to wake up. Call your doctor now or seek immediate medical care if:  · You continue to vomit after 2 hours, or you have new vomiting. · You have a new watery (not like mucus from a cold) or bloody fluid coming from your nose or ears. · You have new weakness or numbness in any part of your body. · You have trouble walking. · Your headaches get worse. · Your vision changes. Watch closely for changes in your health, and be sure to contact your doctor if:  · You do not get better as expected. Where can you learn more? Go to [employer]  Enter B594 in the search box to learn more about \"Closed Head Injury: After Your Visit. \"   © 6713-8712 [employer]. Care instructions adapted under license by [employer] (which disclaims liability or warranty for this information). This care instruction is for use with your licensed healthcare professional. If you have questions about a medical condition or this instruction, always ask your healthcare professional. [address] any warranty or liability for your use of this information.   Content Version: 4.8.041706; Last Revised: June 27, 2012

## 2017-05-29 NOTE — ED TRIAGE NOTES
Per EMS , patient was carrying groceries and the patient got tangled up and fell on his face. Patient has abrasions to the face and skin tears to the right elbow. Patient did not lose consciousness at that time. Patient is alert and oriented. Patient was ambulatory on scene.

## 2017-05-29 NOTE — Clinical Note
Take your prescribed medication as directed. Follow up with your primary care physician. Return to the emergency room with any new or worsening conditions.

## 2017-05-29 NOTE — ED PROVIDER NOTES
HPI Comments: 2:16 PM Ysidro Goodpasture. is a 80 y.o. male with a history of CAD s/p cardiac cath, HTN, CKD, DVT and pacemaker who presents to the emergency department for evaluation after the patient tripped and fell falling onto his face causing abrasions to his R side of face and R elbow with noted pain to the L knee. Pt was attempting to get groceries out of his vehicle when he tripped, he denies any LOC. Pt also denies neck pain or any other injuries at this time. No other complaints or concerns were noted at this time. PCP: Elgin Granda MD      The history is provided by the patient. Past Medical History:   Diagnosis Date    Abnormal myocardial perfusion study 04/12/2012    Small, mild mid to distal anterior septal defect concerning for ischemia. Mild mid to distal anterior septal hypk. EF 61%. Neg EKG on pharm stress test.  Low to intermediate risk.  Arthritis     CAD (coronary artery disease) December 2009    presented with anterior wall STEMI with subsequent 100% proximal LAD thrombotic lesion stented to residual 0% using 3.5-mm Cypher stent (13-mm length). He only had mild disease in his left main, RCA, and circumflex coronary artery.  Chronic kidney disease     Depressive disorder     Dyslipidemia     Gout     History of echocardiogram 06/25/2013    EF 55-60%. No RWMA. Normal diastolic function. Sm pericardial effusion (also noted on echo 4/26/10), without tamponade physiology.  Hypercoagulable state ([facility] [address]     DVT x 2 (L) 2/2014     Hypertension     Hypertrophy (benign) of prostate     Lower extremity venous duplex 07/05/2013    Right leg:  Acute, occlusive DVT in posterior tibial vein. Pulsatile flow.  Neuropathy     Normal ankle brachial index 01/16/2012    No significant peripheral arterial disease bilaterally. R BHANU 1.31.  L BHANU 1.31.    Pacemaker 04/27/10    Implantation of dual-chamber Medtronic pacemaker    S/P cardiac cath 12/28/2009    oRCA 30%. LM patent. CX patent. LAD p100% thrombotic (Pronto thrombectomy, 3.5 x 13 Cypher stent). LVEDP 26.  EF 60%. Min anteroapical hypk.  Sinus bradycardia     pacemaker in 2010    Thromboembolus [facility]) 2011    h/o DVT x2 to LLE during surgery for Right TKR    Venous (peripheral) insufficiency        Past Surgical History:   Procedure Laterality Date    HX COLONOSCOPY  11/2004    neg    HX HEART CATHETERIZATION  12/28/09    LAD stent for AMI 3.5mm Cypher stent (13mm length)    HX ORTHOPAEDIC      shoulder manipulation    HX ORTHOPAEDIC  6/2013    right knee replacement    HX PACEMAKER  04/27/10    Implantation of dual-chamber Medtronic pacemaker         Family History:   Problem Relation Age of Onset    Arthritis-osteo Father        Social History     Social History    Marital status:      Spouse name: N/A    Number of children: N/A    Years of education: N/A     Occupational History    retired wood worker      Social History Main Topics    Smoking status: Former Smoker     Packs/day: 1.00     Quit date: 5/21/1955    Smokeless tobacco: Never Used    Alcohol use No    Drug use: No    Sexual activity: Yes     Partners: Female     Birth control/ protection: None     Other Topics Concern    Not on file     Social History Narrative         ALLERGIES: Sulfa (sulfonamide antibiotics)    Review of Systems   Constitutional: Negative for chills and fever. HENT: Negative for congestion and rhinorrhea. Respiratory: Negative for cough and shortness of breath. Cardiovascular: Negative for chest pain and leg swelling. Gastrointestinal: Negative for abdominal pain and nausea. Genitourinary: Negative for dysuria and hematuria. Musculoskeletal: Positive for arthralgias (L knee pain ). Negative for myalgias and neck pain. Skin: Positive for wound (abrasion r elbow and face). Negative for rash. Neurological: Negative for light-headedness and headaches.    Psychiatric/Behavioral: Negative for confusion and hallucinations. All other systems reviewed and are negative. Vitals:    05/29/17 1437   BP: 135/62   Pulse: 67   Resp: 18   Temp: 98.4 °F (36.9 °C)   SpO2: 98%            Physical Exam   Constitutional: He is oriented to person, place, and time. He appears well-developed and well-nourished. No distress. HENT:   Head: Normocephalic. Right Ear: External ear normal.   Left Ear: External ear normal.   Nose: Nose normal.   Mouth/Throat: Oropharynx is clear and moist.   R frontal abrasion noted, R maxillary abrasion noted   Eyes: Conjunctivae and EOM are normal. Pupils are equal, round, and reactive to light. No scleral icterus. Neck: Normal range of motion. Neck supple. No JVD present. No tracheal deviation present. No thyromegaly present. Winces on palpation, poorly localized pain    Cardiovascular: Normal rate, regular rhythm, normal heart sounds and intact distal pulses. Exam reveals no gallop and no friction rub. No murmur heard. Pulmonary/Chest: Effort normal and breath sounds normal. He exhibits no tenderness. Abdominal: Soft. Bowel sounds are normal. He exhibits no distension. There is no tenderness. There is no rebound and no guarding. Musculoskeletal: He exhibits no edema or tenderness. R elbow with skin tear, FROM, distal pulses and sensation intact    Lymphadenopathy:     He has no cervical adenopathy. Neurological: He is alert and oriented to person, place, and time. No cranial nerve deficit. Coordination normal.   Mild global weakness, gait not observed    Skin: Skin is warm and dry. As above    Psychiatric:   Supportive family at the bedside    Nursing note and vitals reviewed. MDM  Number of Diagnoses or Management Options  Diagnosis management comments: Pt is an 81yo male with a hx of DVT but [facility], HTN, pacer, CAD, CKD presents to the ED with complaint of HA after a trip and fall. Pt hit his head without LOC.   Will CT head, neck, update td, clean his wounds then reevaluate. Gosia Weaver DO 2:48 PM      ED Course       Wound Closure by Adhesive  Date/Time: 5/29/2017 4:42 PM  Performed by: Serena Crowell by: Libby Skaggs     Consent:     Consent obtained:  Written    Consent given by:  Patient    Risks discussed:  Infection, need for additional repair, poor cosmetic result, poor wound healing, pain and retained foreign body  Anesthesia (see MAR for exact dosages): Anesthesia method:  None  Laceration details:     Location:  Face    Face location:  Forehead (R sided)  Pre-procedure details:     Preparation:  Patient was prepped and draped in usual sterile fashion and imaging obtained to evaluate for foreign bodies  Treatment:     Area cleansed with:  Betadine    Amount of cleaning:  Standard    Irrigation solution:  Tap water    Irrigation method:  Syringe  Skin repair:     Repair method:  Tissue adhesive (durmabond)  Post-procedure details:     Dressing:  Open (no dressing)    Patient tolerance of procedure: Tolerated well, no immediate complications  Wound Closure by Adhesive  Date/Time: 5/29/2017 4:43 PM  Performed by: Serena Crowell by: Libby Skaggs     Consent:     Consent obtained:  Written    Consent given by:  Patient    Risks discussed:  Infection, need for additional repair, nerve damage, pain, poor cosmetic result, poor wound healing, retained foreign body, tendon damage and vascular damage  Laceration details:     Location:  Shoulder/arm    Shoulder/arm location:  R elbow (and R finger)  Repair type:     Repair type:  Simple  Treatment:     Area cleansed with:  Betadine    Amount of cleaning:  Standard    Irrigation solution:  Tap water    Irrigation method:  Syringe  Skin repair:     Repair method:  Tissue adhesive (durma bond)  Post-procedure details:     Dressing:  Open (no dressing)    Patient tolerance of procedure:   Tolerated well, no immediate complications  Wound Closure by Adhesive  Date/Time: 5/29/2017 4:44 PM  Performed by: Jarvis Alexandre  Authorized by: Jarvis Alexandre     Consent:     Consent obtained:  Written    Consent given by:  Patient    Risks discussed:  Infection, need for additional repair, nerve damage, pain, poor cosmetic result, poor wound healing, retained foreign body, tendon damage and vascular damage  Laceration details:     Location:  Leg    Leg location:  R knee  Repair type:     Repair type:  Simple  Treatment:     Area cleansed with:  Betadine    Amount of cleaning:  Standard    Irrigation solution:  Sterile saline    Irrigation method:  Syringe  Skin repair:     Repair method:  Tissue adhesive (durmabond)  Post-procedure details:     Dressing:  Open (no dressing)    Patient tolerance of procedure: Tolerated well, no immediate complications        Vitals:  Patient Vitals for the past 12 hrs:   Temp Pulse Resp BP SpO2   05/29/17 1437 98.4 °F (36.9 °C) 67 18 135/62 98 %   98 %. Percentage is within normal limits. Medications ordered:   Medications   diph,Pertuss(AC),Tet Vac-PF (BOOSTRIX) suspension 0.5 mL (not administered)   acetaminophen (TYLENOL) tablet 650 mg (not administered)         X-Ray, CT or other radiology findings or impressions:  XR KNEE RT 3 V   Final Result:    IMPRESSION: Nothing acute per radiology       CT SPINE CERV WO CONT   Final Result:    IMPRESSION:  1. Straightened cervical lordosis. No compression deformity, listhesis or  evidence for acute traumatic dislocation or fracture. 2. Partial fusion C2-3 facet joints as above, likely congenital.  3. Disc bulge/protrusion C4-5 with at least mild central canal narrowing area  4. Multilevel facet encroachment due to uncovertebral and facet spurring as  Above. Per radiology       CT HEAD WO CONT   Final Result:    IMPRESSION:  1. No intracranial hemorrhage or evidence of acute traumatic intracranial  injury.   2. Similar degree of mild volume loss with fairly extensive periventricular and  deep hemispheric small vessel disease change for age. 3. Right frontal scalp hematoma. History per radiology               Progress notes, Consult notes or additional Procedure notes:  Pt has been reassessed. Patient is feeling much better. Discussed all results with pt and pt agrees with plan for discharge. All questions answered at this time. ED warnings given for any new or worsening symptoms. Pt voices understanding. Pt discharged in stable condition. Disposition:  Diagnosis: No diagnosis found. Disposition: Discharged       Scribe Attestation:     I, [address] [place] [address] for and in the presence of Dori Mcdonald MD May 29, 2017 at 4:47 PM     Physician Attestation:   I personally performed the services described in this documentation, reviewed and edited the documentation which was dictated to the scribe in my presence, and it accurately records my words and actions.  Dori Mcdonald MD  May 29, 2017 at 4:47 PM    Signed by: Derick Narayanan May 29, 2017, 4:47 PM

## 2017-05-30 ENCOUNTER — PATIENT OUTREACH (OUTPATIENT)
Dept: INTERNAL MEDICINE CLINIC | Age: 82
End: 2017-05-30

## 2017-05-31 ENCOUNTER — PATIENT OUTREACH (OUTPATIENT)
Dept: INTERNAL MEDICINE CLINIC | Age: 82
End: 2017-05-31

## 2017-05-31 NOTE — PROGRESS NOTES
NN contacted daughter, Eneida Lynn (not listed on PHI) at listed number. NN explained that she could not discuss patient's health, perform discharge assessment or medication reconciliation because she is not listed on PHI. Bhumi verbalized understanding but requested assistance scheduling follow up appointment for patient with Dr. Samara Sanders. NN assisted her with this, HIPAA identifiers x 2. Patient scheduled for follow up with Dr. Samara Sanders on 6/6/17. NN encouraged Bhumi to have patient update his PHI document to include her as being able to discuss his health. Bhumi verbalizes agreement to do this and thanked NN for calling, ended call.

## 2017-06-06 ENCOUNTER — OFFICE VISIT (OUTPATIENT)
Dept: INTERNAL MEDICINE CLINIC | Age: 82
End: 2017-06-06

## 2017-06-06 VITALS
WEIGHT: 194 LBS | RESPIRATION RATE: 16 BRPM | HEIGHT: 69 IN | DIASTOLIC BLOOD PRESSURE: 80 MMHG | TEMPERATURE: 98.7 F | BODY MASS INDEX: 28.73 KG/M2 | OXYGEN SATURATION: 96 % | HEART RATE: 69 BPM | SYSTOLIC BLOOD PRESSURE: 138 MMHG
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
## 2017-06-06 DIAGNOSIS — I10 ESSENTIAL HYPERTENSION: ICD-10-CM

## 2017-06-06 NOTE — PATIENT INSTRUCTIONS

## 2017-06-06 NOTE — PROGRESS NOTES
HPI:   Seen in ER 5/29/17 post fall; w/u neg for serious injury (now with decreasing MSK pain)  Generalized MSK pain is improving  Pt suffers from chronic gait disturbance  w/o chest pain/abd. discomfort; no increased dyspnea, cough or pedal edema; denies constitutional complaints of fever, night sweats or wt loss; no evidence of GI/ hemorrhage; no polyuria/polydipsia. Activity is sedentary    ROS is otherwise negative. Past Medical History:   Diagnosis Date    Abnormal myocardial perfusion study 04/12/2012    Small, mild mid to distal anterior septal defect concerning for ischemia. Mild mid to distal anterior septal hypk. EF 61%. Neg EKG on pharm stress test.  Low to intermediate risk.  Arthritis     CAD (coronary artery disease) December 2009    presented with anterior wall STEMI with subsequent 100% proximal LAD thrombotic lesion stented to residual 0% using 3.5-mm Cypher stent (13-mm length). He only had mild disease in his left main, RCA, and circumflex coronary artery.  Chronic kidney disease     Depressive disorder     Dyslipidemia     Gout     History of echocardiogram 06/25/2013    EF 55-60%. No RWMA. Normal diastolic function. Sm pericardial effusion (also noted on echo 4/26/10), without tamponade physiology.  Hypercoagulable state ([address]     DVT x 2 (L) 2/2014     Hypertension     Hypertrophy (benign) of prostate     Lower extremity venous duplex 07/05/2013    Right leg:  Acute, occlusive DVT in posterior tibial vein. Pulsatile flow.  Neuropathy     Normal ankle brachial index 01/16/2012    No significant peripheral arterial disease bilaterally. R BHANU 1.31.  L BHANU 1.31.    Pacemaker 04/27/10    Implantation of dual-chamber Medtronic pacemaker    S/P cardiac cath 12/28/2009    oRCA 30%. LM patent. CX patent. LAD p100% thrombotic (Pronto thrombectomy, 3.5 x 13 Cypher stent). LVEDP 26.  EF 60%. Min anteroapical hypk.       Sinus bradycardia     pacemaker in 2010    Thromboembolus ([facility] [address] 2011    h/o DVT x2 to LLE during surgery for Right TKR    Venous (peripheral) insufficiency        Past Surgical History:   Procedure Laterality Date    HX COLONOSCOPY  11/2004    neg    HX HEART CATHETERIZATION  12/28/09    LAD stent for AMI 3.5mm Cypher stent (13mm length)    HX ORTHOPAEDIC      shoulder manipulation    HX ORTHOPAEDIC  6/2013    right knee replacement    HX PACEMAKER  04/27/10    Implantation of dual-chamber Medtronic pacemaker       Social History     Social History    Marital status:      Spouse name: N/A    Number of children: N/A    Years of education: N/A     Occupational History    retired wood worker      Social History Main Topics    Smoking status: Former Smoker     Packs/day: 1.00     Quit date: 5/21/1955    Smokeless tobacco: Never Used    Alcohol use No    Drug use: No    Sexual activity: Yes     Partners: Female     Birth control/ protection: None     Other Topics Concern    Not on file     Social History Narrative       Allergies   Allergen Reactions    Sulfa (Sulfonamide Antibiotics) Other (comments)       Family History   Problem Relation Age of Onset    Arthritis-osteo Father        Current Outpatient Prescriptions   Medication Sig Dispense Refill    citalopram (CELEXA) 20 mg tablet Take  by mouth daily.  metoprolol tartrate (LOPRESSOR) 25 mg tablet Take  by mouth two (2) times a day.  guaiFENesin-codeine (CHERATUSSIN AC) 100-10 mg/5 mL solution 1 or 2 tsp q 6 hrs prn cough 120 mL 1    losartan-hydrochlorothiazide (HYZAAR) 50-12.5 mg per tablet Take 1 Tab by mouth daily. 30 Tab 10    traMADol (ULTRAM) 50 mg tablet 1 or 2 po tid prn pain 40 Tab 1    aspirin delayed-release 81 mg tablet Take 1 Tab by mouth daily. 30 Tab 0    fluticasone (FLONASE) 50 mcg/actuation nasal spray use in each nostril (Patient taking differently: 2 Sprays by Both Nostrils route daily as needed.  use in each nostril) 1 Bottle 0    meclizine (ANTIVERT) 25 mg tablet Take 1 Tab by mouth three (3) times daily as needed for Dizziness. 30 Tab 0    simvastatin (ZOCOR) 20 mg tablet Take 1 Tab by mouth nightly. 90 Tab 3    latanoprost (XALATAN) 0.005 % ophthalmic solution Administer 1 Drop to both eyes nightly. Visit Vitals    /80    Pulse 69    Temp 98.7 °F (37.1 °C) (Tympanic)    Resp 16    Ht 5' 9\" (1.753 m)    Wt 194 lb (88 kg)    SpO2 96%    BMI 28.65 kg/m2       PE  Heavy WM in NAD  HEENT: PERRLA, EOMI;  OP: clear. Neck: supple w/o mass or bruits. Chest: clear. CV: RRR w/o m,r,g; pulses tr distal legs  Abd: soft, NT, w/o HSM or mass. Ext: tr edema. MSK: FROM of (B) knees w/o swelling; mild pain with motion; minimal tenderness; scab (R) knee with bruise on (L)  bruising (L) face with scabs noted (R) face/scalp  Neuro: NF. Assessment and Plan    Encounter Diagnoses   Name Primary?  Fall, subsequent encounter Yes    Essential hypertension    recent fall; urged walker/cane   Fall prevention discussed; no evidence of serious injury  HTN - controlled  I have reviewed/discussed the above normal BMI with the patient. I have recommended the following interventions: dietary management education, guidance, and counseling . Salome Lai     No change in rx  OV 3 mos or prn  I have explained plan to patient and the patient verbalizes understanding

## 2017-06-06 NOTE — PROGRESS NOTES
1. Have you been to the ER, urgent care clinic since your last visit? Hospitalized since your last visit? Yes When: june 1 Where: [facility] Reason for visit: fall    2. Have you seen or consulted any other health care providers outside of the [address] [place] [address] since your last visit? Include any pap smears or colon screening.  No

## 2017-06-06 NOTE — MR AVS SNAPSHOT
Visit Information Date & Time Provider Department Dept. Phone Encounter #  
 6/6/2017 10:45 AM Osa Burkitt, MD [facility] INTERNAL MEDICINE OF Tonia Currie [phone] [id number] Follow-up Instructions Return if symptoms worsen or fail to improve. Your Appointments 9/8/2017 12:00 PM  
Follow Up with Osa Burkitt, MD  
[facility]IC[facility] App[facility] Note: 6 mo f/u  
 [address], [address]  
  
   
 [address], [address] [facility] [address] Upcoming Health Maintenance Date Due ZOSTER VACCINE AGE 60> 4/11/1992 GLAUCOMA SCREENING Q2Y 4/11/1997 DTaP/Tdap/Td series (1 - Tdap) 3/19/2017 INFLUENZA AGE 9 TO ADULT 8/1/2017 MEDICARE YEARLY EXAM 3/8/2018 Allergies as of 6/6/2017  Review Complete On: 6/6/2017 By: Osa Burkitt, MD  
  
 Severity Noted Reaction Type Reactions Sulfa (Sulfonamide Antibiotics)  01/27/2011    Other (comments) Current Immunizations  Reviewed on 2/4/2016 Name Date Influenza High Dose Vaccine PF 9/9/2016, 9/18/2015 Pneumococcal Conjugate (PCV-13) 1/2/2003 Pneumococcal Vaccine (Unspecified Type) 1/12/2012 Td 3/18/2017 Not reviewed this visit You Were Diagnosed With   
  
 Codes Comments Fall, subsequent encounter    -  Primary ICD-10-CM: W19. Emilia Vazquez ICD-9-CM: V58.89, E888.9 Essential hypertension     ICD-10-CM: I10 
ICD-9-CM: 401.9 Vitals BP Pulse Temp Resp Height(growth percentile) Weight(growth percentile) 138/80 69 98.7 °F (37.1 °C) (Tympanic) 16 5' 9\" (1.753 m) 194 lb (88 kg) SpO2 BMI Smoking Status 96% 28.65 kg/m2 Former Smoker Vitals History BMI and BSA Data Body Mass Index Body Surface Area  
 28.65 kg/m 2 2.07 m 2 Preferred Pharmacy Pharmacy Name Phone [id number] [address] [place] [address] [phone] Your Updated Medication List  
  
   
This list is accurate as of: 6/6/17 10:52 AM.  Always use your most recent med list.  
  
  
  
  
 aspirin delayed-release 81 mg tablet Take 1 Tab by mouth daily. CeleXA 20 mg tablet Generic drug:  citalopram  
Take  by mouth daily. fluticasone 50 mcg/actuation nasal spray Commonly known as:  FLONASE  
use in each nostril  
  
 guaiFENesin-codeine 100-10 mg/5 mL solution Commonly known as:  CHERATUSSIN AC  
1 or 2 tsp q 6 hrs prn cough  
  
 losartan-hydroCHLOROthiazide 50-12.5 mg per tablet Commonly known as:  HYZAAR Take 1 Tab by mouth daily. meclizine 25 mg tablet Commonly known as:  ANTIVERT Take 1 Tab by mouth three (3) times daily as needed for Dizziness. metoprolol tartrate 25 mg tablet Commonly known as:  LOPRESSOR Take  by mouth two (2) times a day. simvastatin 20 mg tablet Commonly known as:  ZOCOR Take 1 Tab by mouth nightly. traMADol 50 mg tablet Commonly known as:  ULTRAM  
1 or 2 po tid prn pain XALATAN 0.005 % ophthalmic solution Generic drug:  latanoprost  
Administer 1 Drop to both eyes nightly. Follow-up Instructions Return if symptoms worsen or fail to improve. Patient Instructions Preventing Falls: Care Instructions Your Care Instructions Getting around your home safely can be a challenge if you have injuries or health problems that make it easy for you to fall. Loose rugs and furniture in walkways are among the dangers for many older people who have problems walking or who have poor eyesight. People who have conditions such as arthritis, osteoporosis, or dementia also have to be careful not to fall. You can make your home safer with a few simple measures. Follow-up care is a key part of your treatment and safety.  Be sure to make and go to all appointments, and call your doctor if you are having problems. It's also a good idea to know your test results and keep a list of the medicines you take. How can you care for yourself at home? Taking care of yourself · You may get dizzy if you do not drink enough water. To prevent dehydration, drink plenty of fluids, enough so that your urine is light yellow or clear like water. Choose water and other caffeine-free clear liquids. If you have kidney, heart, or liver disease and have to limit fluids, talk with your doctor before you increase the amount of fluids you drink. · Exercise regularly to improve your strength, muscle tone, and balance. Walk if you can. Swimming may be a good choice if you cannot walk easily. · Have your vision and hearing checked each year or any time you notice a change. If you have trouble seeing and hearing, you might not be able to avoid objects and could lose your balance. · Know the side effects of the medicines you take. Ask your doctor or pharmacist whether the medicines you take can affect your balance. Sleeping pills or sedatives can affect your balance. · Limit the amount of alcohol you drink. Alcohol can impair your balance and other senses. · Ask your doctor whether calluses or corns on your feet need to be removed. If you wear loose-fitting shoes because of calluses or corns, you can lose your balance and fall. · Talk to your doctor if you have numbness in your feet. Preventing falls at home · Remove raised doorway thresholds, throw rugs, and clutter. Repair loose carpet or raised areas in the floor. · Move furniture and electrical cords to keep them out of walking paths. · Use nonskid floor wax, and wipe up spills right away, especially on ceramic tile floors. · If you use a walker or cane, put rubber tips on it. If you use crutches, clean the bottoms of them regularly with an abrasive pad, such as steel wool.  
· Keep your house well lit, especially [address], and outside walkways. Use night-lights in areas such as hallways and bathrooms. Add extra light switches or use remote switches (such as switches that go on or off when you clap your hands) to make it easier to turn lights on if you have to get up during the night. · Install sturdy handrails on stairways. · Move items in your cabinets so that the things you use a lot are on the lower shelves (about waist level). · Keep a cordless phone and a flashlight with new batteries by your bed. If possible, put a phone in each of the main rooms of your house, or carry a cell phone in case you fall and cannot reach a phone. Or, you can wear a device around your neck or wrist. You push a button that sends a signal for help. · Wear low-heeled shoes that fit well and give your feet good support. Use footwear with nonskid soles. Check the heels and soles of your shoes for wear. Repair or replace worn heels or soles. · Do not wear socks without shoes on wood floors. · Walk on the grass when the sidewalks are slippery. If you live in an area that gets snow and ice in the winter, sprinkle salt on slippery steps and sidewalks. Preventing falls in the bath · Install grab bars and nonskid mats inside and outside your shower or tub and near the toilet and sinks. · Use shower chairs and bath benches. · Use a hand-held shower head that will allow you to sit while showering. · Get into a tub or shower by putting the weaker leg in first. Get out of a tub or shower with your strong side first. 
· Repair loose toilet seats and consider installing a raised toilet seat to make getting on and off the toilet easier. · Keep your bathroom door unlocked while you are in the shower. Where can you learn more? Go to http://leela-estela.info/. Enter [phone] in the search box to learn more about \"Preventing Falls: Care Instructions. \" Current as of: August 4, 2016 Content Version: 11.2 © 3846-5906 [facility]. Care instructions adapted under license by [employer] (which disclaims liability or warranty for this information). If you have questions about a medical condition or this instruction, always ask your healthcare professional. [address] any warranty or liability for your use of this information. Introducing [facility] HE[facility]! [employer] introduces Poll Everywhere patient portal. Now you can access parts of your medical record, email your doctor's office, and request medication refills online. 1. In your internet browser, go to [email or web address] 2. Click on the First Time User? Click Here link in the Sign In box. You will see the New Member Sign Up page. 3. Enter your Poll Everywhere Access Code exactly as it appears below. You will not need to use this code after youve completed the sign-up process. If you do not sign up before the expiration date, you must request a new code. · Poll Everywhere Access Code: [email or web address] Expires: 9/4/2017 10:52 AM 
 
4. Enter the last four digits of your Social Security Number (xxxx) and Date of Birth (mm/dd/yyyy) as indicated and click Submit. You will be taken to the next sign-up page. 5. Create a Poll Everywhere ID. This will be your Poll Everywhere login ID and cannot be changed, so think of one that is secure and easy to remember. 6. Create a Poll Everywhere password. You can change your password at any time. 7. Enter your Password Reset Question and Answer. This can be used at a later time if you forget your password. 8. Enter your e-mail address. You will receive e-mail notification when new information is available in [address] 9. Click Sign Up. You can now view and download portions of your medical record. 10. Click the Download Summary menu link to download a portable copy of your medical information.  
 
If you have questions, please visit the Frequently Asked Questions section of the [address]. Remember, [employer] is NOT to be used for urgent needs. For medical emergencies, dial 911. Now available from your iPhone and Android! Please provide this summary of care documentation to your next provider. Your primary care clinician is listed as Dilia Resendiz. If you have any questions after today's visit, please call [phone].

## 2017-07-01 ENCOUNTER — APPOINTMENT (OUTPATIENT)
Dept: CT IMAGING | Age: 82
End: 2017-07-01
Attending: EMERGENCY MEDICINE
Payer: MEDICARE

## 2017-07-01 ENCOUNTER — HOSPITAL ENCOUNTER (EMERGENCY)
Age: 82
Discharge: HOME | End: 2017-07-02
Attending: EMERGENCY MEDICINE
Payer: MEDICARE

## 2017-07-01 VITALS
SYSTOLIC BLOOD PRESSURE: 170 MMHG | RESPIRATION RATE: 16 BRPM | HEART RATE: 61 BPM | OXYGEN SATURATION: 98 % | BODY MASS INDEX: 30.62 KG/M2 | WEIGHT: 202 LBS | HEIGHT: 68 IN | DIASTOLIC BLOOD PRESSURE: 83 MMHG | TEMPERATURE: 98.6 F

## 2017-07-01 DIAGNOSIS — F03.90 DEMENTIA WITHOUT BEHAVIORAL DISTURBANCE, UNSPECIFIED DEMENTIA TYPE: Primary | ICD-10-CM
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
## 2017-07-01 DIAGNOSIS — S00.83XA FOREHEAD CONTUSION, INITIAL ENCOUNTER: ICD-10-CM

## 2017-07-01 LAB
ALBUMIN SERPL BCP-MCNC: 3.8 G/DL (ref 3.4–5)
ALBUMIN/GLOB SERPL: 1 {RATIO} (ref 0.8–1.7)
ALP SERPL-CCNC: 91 U/L (ref 45–117)
ALT SERPL-CCNC: 19 U/L (ref 16–61)
ANION GAP BLD CALC-SCNC: 6 MMOL/L (ref 3–18)
APPEARANCE UR: ABNORMAL
APTT PPP: 28.6 SEC (ref 23–36.4)
AST SERPL W P-5'-P-CCNC: 23 U/L (ref 15–37)
BACTERIA URNS QL MICRO: ABNORMAL /HPF
BASOPHILS # BLD AUTO: 0 K/UL (ref 0–0.1)
BASOPHILS # BLD: 0 % (ref 0–2)
BILIRUB SERPL-MCNC: 0.7 MG/DL (ref 0.2–1)
BILIRUB UR QL: NEGATIVE
BUN SERPL-MCNC: 26 MG/DL (ref 7–18)
BUN/CREAT SERPL: 20 (ref 12–20)
CALCIUM SERPL-MCNC: 8.6 MG/DL (ref 8.5–10.1)
CHLORIDE SERPL-SCNC: 101 MMOL/L (ref 100–108)
CO2 SERPL-SCNC: 33 MMOL/L (ref 21–32)
COLOR UR: YELLOW
CREAT SERPL-MCNC: 1.33 MG/DL (ref 0.6–1.3)
DIFFERENTIAL METHOD BLD: ABNORMAL
EOSINOPHIL # BLD: 0.1 K/UL (ref 0–0.4)
EOSINOPHIL NFR BLD: 1 % (ref 0–5)
EPITH CASTS URNS QL MICRO: ABNORMAL /LPF (ref 0–5)
ERYTHROCYTE [DISTWIDTH] IN BLOOD BY AUTOMATED COUNT: 12.7 % (ref 11.6–14.5)
GLOBULIN SER CALC-MCNC: 3.7 G/DL (ref 2–4)
GLUCOSE SERPL-MCNC: 80 MG/DL (ref 74–99)
GLUCOSE UR STRIP.AUTO-MCNC: NEGATIVE MG/DL
HCT VFR BLD AUTO: 39.2 % (ref 36–48)
HGB BLD-MCNC: 13.4 G/DL (ref 13–16)
HGB UR QL STRIP: ABNORMAL
HYALINE CASTS URNS QL MICRO: ABNORMAL /LPF (ref 0–2)
INR PPP: 1 (ref 0.8–1.2)
KETONES UR QL STRIP.AUTO: NEGATIVE MG/DL
LEUKOCYTE ESTERASE UR QL STRIP.AUTO: NEGATIVE
LIPASE SERPL-CCNC: 111 U/L (ref 73–393)
LYMPHOCYTES # BLD AUTO: 12 % (ref 21–52)
LYMPHOCYTES # BLD: 1.1 K/UL (ref 0.9–3.6)
MAGNESIUM SERPL-MCNC: 2.1 MG/DL (ref 1.6–2.6)
MCH RBC QN AUTO: 31.1 PG (ref 24–34)
MCHC RBC AUTO-ENTMCNC: 34.2 G/DL (ref 31–37)
MCV RBC AUTO: 91 FL (ref 74–97)
MONOCYTES # BLD: 0.6 K/UL (ref 0.05–1.2)
MONOCYTES NFR BLD AUTO: 7 % (ref 3–10)
MUCOUS THREADS URNS QL MICRO: ABNORMAL /LPF
NEUTS SEG # BLD: 7.2 K/UL (ref 1.8–8)
NEUTS SEG NFR BLD AUTO: 80 % (ref 40–73)
NITRITE UR QL STRIP.AUTO: NEGATIVE
PH UR STRIP: 5.5 [PH] (ref 5–8)
PLATELET # BLD AUTO: 105 K/UL (ref 135–420)
PMV BLD AUTO: 12.5 FL (ref 9.2–11.8)
POTASSIUM SERPL-SCNC: 3.7 MMOL/L (ref 3.5–5.5)
PROT SERPL-MCNC: 7.5 G/DL (ref 6.4–8.2)
PROT UR STRIP-MCNC: NEGATIVE MG/DL
PROTHROMBIN TIME: 13 SEC (ref 11.5–15.2)
RBC # BLD AUTO: 4.31 M/UL (ref 4.7–5.5)
RBC #/AREA URNS HPF: ABNORMAL /HPF (ref 0–5)
SODIUM SERPL-SCNC: 140 MMOL/L (ref 136–145)
SP GR UR REFRACTOMETRY: 1.02 (ref 1–1.03)
TSH SERPL DL<=0.05 MIU/L-ACNC: 2 UIU/ML (ref 0.36–3.74)
UROBILINOGEN UR QL STRIP.AUTO: 0.2 EU/DL (ref 0.2–1)
WBC # BLD AUTO: 9 K/UL (ref 4.6–13.2)
WBC URNS QL MICRO: ABNORMAL /HPF (ref 0–4)

## 2017-07-01 PROCEDURE — 81001 URINALYSIS AUTO W/SCOPE: CPT | Performed by: EMERGENCY MEDICINE

## 2017-07-01 PROCEDURE — 96360 HYDRATION IV INFUSION INIT: CPT

## 2017-07-01 PROCEDURE — 70450 CT HEAD/BRAIN W/O DYE: CPT

## 2017-07-01 PROCEDURE — 83690 ASSAY OF LIPASE: CPT | Performed by: EMERGENCY MEDICINE

## 2017-07-01 PROCEDURE — 74011250636 HC RX REV CODE- 250/636: Performed by: EMERGENCY MEDICINE

## 2017-07-01 PROCEDURE — 83735 ASSAY OF MAGNESIUM: CPT | Performed by: EMERGENCY MEDICINE

## 2017-07-01 PROCEDURE — 84443 ASSAY THYROID STIM HORMONE: CPT | Performed by: EMERGENCY MEDICINE

## 2017-07-01 PROCEDURE — 93005 ELECTROCARDIOGRAM TRACING: CPT

## 2017-07-01 PROCEDURE — 96361 HYDRATE IV INFUSION ADD-ON: CPT

## 2017-07-01 PROCEDURE — 85025 COMPLETE CBC W/AUTO DIFF WBC: CPT | Performed by: EMERGENCY MEDICINE

## 2017-07-01 PROCEDURE — 99285 EMERGENCY DEPT VISIT HI MDM: CPT

## 2017-07-01 PROCEDURE — 80053 COMPREHEN METABOLIC PANEL: CPT | Performed by: EMERGENCY MEDICINE

## 2017-07-01 PROCEDURE — 85610 PROTHROMBIN TIME: CPT | Performed by: EMERGENCY MEDICINE

## 2017-07-01 PROCEDURE — 85730 THROMBOPLASTIN TIME PARTIAL: CPT | Performed by: EMERGENCY MEDICINE

## 2017-07-01 RX ADMIN — SODIUM CHLORIDE 500 ML: 900 INJECTION, SOLUTION INTRAVENOUS at 21:51

## 2017-07-01 NOTE — ED TRIAGE NOTES
Per [facility] pt was at home, fell and hit his head, per family pt has history of falling frequently b/c refuses to use walker. Pt is c/o Left rib pain, left lower back pain. Pt has hx of hypertension, knee replacement elevated cholesterol. B/P 170/100, r 14 spo2 97% room air.

## 2017-07-02 LAB
ATRIAL RATE: 63 BPM
CALCULATED R AXIS, ECG10: -9 DEGREES
CALCULATED T AXIS, ECG11: 16 DEGREES
DIAGNOSIS, 93000: NORMAL
P-R INTERVAL, ECG05: 164 MS
Q-T INTERVAL, ECG07: 430 MS
QRS DURATION, ECG06: 84 MS
QTC CALCULATION (BEZET), ECG08: 440 MS
VENTRICULAR RATE, ECG03: 63 BPM

## 2017-07-02 NOTE — ED NOTES
Hourly rounding complete. Safety  Pt resting   [x  ]  On stretcher with side rails up and bed in locked position, call bell within reach  [  ]  Sitting in chair with casters locked, call bell within reach    Toileting  [  ] pt denies need to use bathroom  [  ] pt assisted to bathroom  [  ] pt assisted with bedpan  [  ] pt independent to bathroom as needed  Wearing brief   Ongoing Plan of Care  Plan of care and expected time for test and results reviewed with pt.     Pain Management / Comfort  [  ] dimmed lights  [  x] warm blanket provided  [x  ] pain assessed  [  ] monitor alarms reviewed  Daughter and wife at bedside

## 2017-07-02 NOTE — ED NOTES
Hourly rounding complete. Safety  Pt resting   [ x ]  On stretcher with side rails up and bed in locked position, call bell within reach  [  ]  Sitting in chair with casters locked, call bell within reach    Toileting  [  x] pt denies need to use bathroom  [  ] pt assisted to bathroom  [  ] pt assisted with bedpan  [  ] pt independent to bathroom as needed    Ongoing Plan of Care  Plan of care and expected time for test and results reviewed with pt.     Pain Management / Comfort  [  ] dimmed lights  [ x ] warm blanket provided  [ x ] pain assessed  [  ] monitor alarms reviewed    Daughter and   wife Laureano Weathers )who is also a patient are in room

## 2017-07-02 NOTE — ED NOTES
Hourly rounding complete. Safety  Pt resting   [ x ]  On stretcher with side rails up and bed in locked position, call bell within reach  [  ]  Sitting in chair with casters locked, call bell within reach    Toileting  [  ] pt denies need to use bathroom  [  ] pt assisted to bathroom  [  ] pt assisted with bedpan  [  ] pt independent to bathroom as needed  Wearing brief  Ongoing Plan of Care  Plan of care and expected time for test and results reviewed with pt.     Pain Management / Comfort  [  ] dimmed lights  [  ] warm blanket provided  [  ] pain assessed  [ x ] monitor alarms reviewed  Daughter and wife at bedside

## 2017-07-02 NOTE — ED PROVIDER NOTES
HPI Comments: 8:00 PM Aryan Thorpe is a 80 y.o. male with a h/o HTN, CAD, CKD, frequent falling, and Smoking 1PPD presents to the ED via EMS from for falling from standing height onset several hours ago. Pt stated he is clumsy and tripped on the wire on the vacuum , and that he does not believe he hit his head although someone told him he did. Pt reported left flank pain. Pt denied LOC, n/v/d, CP, SOB, or cough. Pt is a poor historian and stated the current year is 2007. Pt noted his Tetanus is UTD, and that he takes ASA. All other sx denied. No other complaints at this time. Angela Antunez MD      The history is provided by the patient. Past Medical History:   Diagnosis Date    Abnormal myocardial perfusion study 04/12/2012    Small, mild mid to distal anterior septal defect concerning for ischemia. Mild mid to distal anterior septal hypk. EF 61%. Neg EKG on pharm stress test.  Low to intermediate risk.  Arthritis     CAD (coronary artery disease) December 2009    presented with anterior wall STEMI with subsequent 100% proximal LAD thrombotic lesion stented to residual 0% using 3.5-mm Cypher stent (13-mm length). He only had mild disease in his left main, RCA, and circumflex coronary artery.  Chronic kidney disease     Depressive disorder     Dyslipidemia     Gout     History of echocardiogram 06/25/2013    EF 55-60%. No RWMA. Normal diastolic function. Sm pericardial effusion (also noted on echo 4/26/10), without tamponade physiology.  Hypercoagulable state ([address]     DVT x 2 (L) 2/2014     Hypertension     Hypertrophy (benign) of prostate     Lower extremity venous duplex 07/05/2013    Right leg:  Acute, occlusive DVT in posterior tibial vein. Pulsatile flow.  Neuropathy     Normal ankle brachial index 01/16/2012    No significant peripheral arterial disease bilaterally.   R BHANU 1.31.  L BHANU 1.31.    Pacemaker 04/27/10    Implantation of dual-chamber Medtronic pacemaker    S/P cardiac cath 12/28/2009    oRCA 30%. LM patent. CX patent. LAD p100% thrombotic (Pronto thrombectomy, 3.5 x 13 Cypher stent). LVEDP 26.  EF 60%. Min anteroapical hypk.  Sinus bradycardia     pacemaker in 2010    Thromboembolus [facility]) 2011    h/o DVT x2 to LLE during surgery for Right TKR    Venous (peripheral) insufficiency        Past Surgical History:   Procedure Laterality Date    HX COLONOSCOPY  11/2004    neg    HX HEART CATHETERIZATION  12/28/09    LAD stent for AMI 3.5mm Cypher stent (13mm length)    HX ORTHOPAEDIC      shoulder manipulation    HX ORTHOPAEDIC  6/2013    right knee replacement    HX PACEMAKER  04/27/10    Implantation of dual-chamber Medtronic pacemaker         Family History:   Problem Relation Age of Onset    Arthritis-osteo Father        Social History     Social History    Marital status:      Spouse name: N/A    Number of children: N/A    Years of education: N/A     Occupational History    retired wood worker      Social History Main Topics    Smoking status: Former Smoker     Packs/day: 1.00     Quit date: 5/21/1955    Smokeless tobacco: Never Used    Alcohol use No    Drug use: No    Sexual activity: Yes     Partners: Female     Birth control/ protection: None     Other Topics Concern    Not on file     Social History Narrative         ALLERGIES: Sulfa (sulfonamide antibiotics)    Review of Systems   Unable to perform ROS: Dementia       Vitals:    07/01/17 1936 07/01/17 2000 07/01/17 2146 07/01/17 2230   BP: 160/88 147/70  170/83   Pulse:   60 61   Resp:   16 16   Temp:       SpO2: 98% 100% 98% 98%   Weight:       Height:                Physical Exam   Constitutional: He is oriented to person, place, and time. He appears well-developed and well-nourished. No distress. HENT:   Head: Normocephalic and atraumatic. Eyes: Conjunctivae and EOM are normal. Pupils are equal, round, and reactive to light. No scleral icterus. Neck: Normal range of motion. Neck supple. No JVD present. No thyromegaly present. Cardiovascular: Normal rate, regular rhythm, S1 normal and S2 normal.  Exam reveals no gallop and no friction rub. No murmur heard. Pulmonary/Chest: Effort normal and breath sounds normal. No accessory muscle usage. No respiratory distress. Abdominal: Soft. Normal appearance. He exhibits no distension. There is no tenderness. There is no rigidity, no rebound and no guarding. Musculoskeletal: Normal range of motion. He exhibits tenderness. He exhibits no edema. Tenderness to palpation over left lower lumbar paraspinal muscles. No midline tenderness or step off over the C, T and L spine. Neurological: He is alert and oriented to person, place, and time. He has normal strength. No cranial nerve deficit or sensory deficit. Coordination normal.   No facial asymmetry or focal deficits. Skin: Skin is warm and intact. No rash noted. Abrasion over anterior forehead. Skin break down and scabbing over left dorsal forearm. Psychiatric: He has a normal mood and affect. His speech is normal and behavior is normal.   Vitals reviewed. MDM  Number of Diagnoses or Management Options  Dementia without behavioral disturbance, unspecified dementia type:   Fall, initial encounter:   Forehead contusion, initial encounter:   Diagnosis management comments: Malinda Coles is a 80 y.o. Male coming in via EMS for a fall. No complaints except some mild left lumbar pain. No evidence of significant traumatic injury. Ct head negative for acute injury. Daughter here and confirms that this is his baseline mental status. Will d/c home with daughter to follow up with pcp as an outpatient.      ED Course       Procedures      Vitals:  Patient Vitals for the past 12 hrs:   Temp Pulse Resp BP SpO2   07/01/17 2230 - 61 16 170/83 98 %   07/01/17 2146 - 60 16 - 98 %   07/01/17 2000 - - - 147/70 100 %   07/01/17 1936 - - - 160/88 98 % 07/01/17 1935 98.6 °F (37 °C) 60 - 160/88 97 %         Medications ordered:   Medications   sodium chloride 0.9 % bolus infusion 500 mL (0 mL IntraVENous IV Completed 7/2/17 0126)         Lab findings:  Recent Results (from the past 12 hour(s))   EKG, 12 LEAD, INITIAL    Collection Time: 07/01/17  7:51 PM   Result Value Ref Range    Ventricular Rate 63 BPM    Atrial Rate 63 BPM    P-R Interval 164 ms    QRS Duration 84 ms    Q-T Interval 430 ms    QTC Calculation (Bezet) 440 ms    Calculated R Axis -9 degrees    Calculated T Axis 16 degrees    Diagnosis       Electronic atrial pacemaker  Low voltage QRS  Borderline ECG  When compared with ECG of 03-FEB-2016 07:37,  Electronic atrial pacemaker has replaced Ectopic atrial rhythm     CBC WITH AUTOMATED DIFF    Collection Time: 07/01/17  9:10 PM   Result Value Ref Range    WBC 9.0 4.6 - 13.2 K/uL    RBC 4.31 (L) 4.70 - 5.50 M/uL    HGB 13.4 13.0 - 16.0 g/dL    HCT 39.2 36.0 - 48.0 %    MCV 91.0 74.0 - 97.0 FL    MCH 31.1 24.0 - 34.0 PG    MCHC 34.2 31.0 - 37.0 g/dL    RDW 12.7 11.6 - 14.5 %    PLATELET 060 (L) 120 - 420 K/uL    MPV 12.5 (H) 9.2 - 11.8 FL    NEUTROPHILS 80 (H) 40 - 73 %    LYMPHOCYTES 12 (L) 21 - 52 %    MONOCYTES 7 3 - 10 %    EOSINOPHILS 1 0 - 5 %    BASOPHILS 0 0 - 2 %    ABS. NEUTROPHILS 7.2 1.8 - 8.0 K/UL    ABS. LYMPHOCYTES 1.1 0.9 - 3.6 K/UL    ABS. MONOCYTES 0.6 0.05 - 1.2 K/UL    ABS. EOSINOPHILS 0.1 0.0 - 0.4 K/UL    ABS.  BASOPHILS 0.0 0.0 - 0.1 K/UL    DF AUTOMATED     METABOLIC PANEL, COMPREHENSIVE    Collection Time: 07/01/17  9:10 PM   Result Value Ref Range    Sodium 140 136 - 145 mmol/L    Potassium 3.7 3.5 - 5.5 mmol/L    Chloride 101 100 - 108 mmol/L    CO2 33 (H) 21 - 32 mmol/L    Anion gap 6 3.0 - 18 mmol/L    Glucose 80 74 - 99 mg/dL    BUN 26 (H) 7.0 - 18 MG/DL    Creatinine 1.33 (H) 0.6 - 1.3 MG/DL    BUN/Creatinine ratio 20 12 - 20      GFR est AA >60 >60 ml/min/1.73m2    GFR est non-AA 51 (L) >60 ml/min/1.73m2    Calcium 8.6 8.5 - 10.1 MG/DL    Bilirubin, total 0.7 0.2 - 1.0 MG/DL    ALT (SGPT) 19 16 - 61 U/L    AST (SGOT) 23 15 - 37 U/L    Alk. phosphatase 91 45 - 117 U/L    Protein, total 7.5 6.4 - 8.2 g/dL    Albumin 3.8 3.4 - 5.0 g/dL    Globulin 3.7 2.0 - 4.0 g/dL    A-G Ratio 1.0 0.8 - 1.7     LIPASE    Collection Time: 07/01/17  9:10 PM   Result Value Ref Range    Lipase 111 73 - 393 U/L   MAGNESIUM    Collection Time: 07/01/17  9:10 PM   Result Value Ref Range    Magnesium 2.1 1.6 - 2.6 mg/dL   TSH 3RD GENERATION    Collection Time: 07/01/17  9:10 PM   Result Value Ref Range    TSH 2.00 0.36 - 3.74 uIU/mL   PTT    Collection Time: 07/01/17  9:10 PM   Result Value Ref Range    aPTT 28.6 23.0 - 36.4 SEC   PROTHROMBIN TIME + INR    Collection Time: 07/01/17  9:10 PM   Result Value Ref Range    Prothrombin time 13.0 11.5 - 15.2 sec    INR 1.0 0.8 - 1.2     URINALYSIS W/ RFLX MICROSCOPIC    Collection Time: 07/01/17  9:25 PM   Result Value Ref Range    Color YELLOW      Appearance CLOUDY      Specific gravity 1.021 1.005 - 1.030      pH (UA) 5.5 5.0 - 8.0      Protein NEGATIVE  NEG mg/dL    Glucose NEGATIVE  NEG mg/dL    Ketone NEGATIVE  NEG mg/dL    Bilirubin NEGATIVE  NEG      Blood SMALL (A) NEG      Urobilinogen 0.2 0.2 - 1.0 EU/dL    Nitrites NEGATIVE  NEG      Leukocyte Esterase NEGATIVE  NEG     URINE MICROSCOPIC ONLY    Collection Time: 07/01/17  9:25 PM   Result Value Ref Range    WBC 4 to 10 0 - 4 /hpf    RBC 4 to 10 0 - 5 /hpf    Epithelial cells 1+ 0 - 5 /lpf    Bacteria FEW (A) NEG /hpf    Mucus 2+ (A) NEG /lpf    Hyaline cast 0 to 3 0 - 2 /lpf       EKG interpretation by ED Physician:  8:13 PM  Sinus rhythm with a rate of 63bpm No evidence of WPW, HOCM, Brugada, prolonged QTc, or acute ischemia on ekg. Reevaluation of patient: Patient has remained at baseline mental status and resting comfortably in bed.  Discussed return precautions and follow up plan with daughter and patient and they are in agreement with plan.      Disposition:  Diagnosis:   1. Dementia without behavioral disturbance, unspecified dementia type    2. Forehead contusion, initial encounter    3. Fall, initial encounter        Disposition: discarge    Follow-up Information     Follow up With Details Comments Contact Info    Akira Beverly MD Go in 2 days For follow up [address]      [facility] EMERGENCY DEPT Go to As needed, If symptoms worsen [address] [place] [id number]  [phone]            Patient's Medications   Start Taking    No medications on file   Continue Taking    ASPIRIN DELAYED-RELEASE 81 MG TABLET    Take 1 Tab by mouth daily. CITALOPRAM (CELEXA) 20 MG TABLET    Take  by mouth daily. FLUTICASONE (FLONASE) 50 MCG/ACTUATION NASAL SPRAY    use in each nostril    GUAIFENESIN-CODEINE (CHERATUSSIN AC) 100-10 MG/5 ML SOLUTION    1 or 2 tsp q 6 hrs prn cough    LATANOPROST (XALATAN) 0.005 % OPHTHALMIC SOLUTION    Administer 1 Drop to both eyes nightly. LOSARTAN-HYDROCHLOROTHIAZIDE (HYZAAR) 50-12.5 MG PER TABLET    Take 1 Tab by mouth daily. MECLIZINE (ANTIVERT) 25 MG TABLET    Take 1 Tab by mouth three (3) times daily as needed for Dizziness. METOPROLOL TARTRATE (LOPRESSOR) 25 MG TABLET    Take  by mouth two (2) times a day. SIMVASTATIN (ZOCOR) 20 MG TABLET    Take 1 Tab by mouth nightly. TRAMADOL (ULTRAM) 50 MG TABLET    1 or 2 po tid prn pain   These Medications have changed    No medications on file   Stop Taking    No medications on file            Derick [id number] for and in the presence of Angel Britton MD 8:13 PM, 07/02/17. Physician Attestation  I personally performed the services described in the documentation, reviewed the documentation, as recorded by the scribe in my presence, and it accurately and completely records my words and actions.     Angel Britton MD 8:13 PM 07/02/17        Signed by : Derick Nguyen, 07/02/17 at 8:13 PM

## 2017-07-02 NOTE — ED NOTES
Hourly rounding complete. Safety  Pt resting   [ x ]  On stretcher with side rails up and bed in locked position, call bell within reach  [  ]  Sitting in chair with casters locked, call bell within reach    Toileting  [  ] pt denies need to use bathroom  [  ] pt assisted to bathroom  [  ] pt assisted with bedpan  [  ] pt independent to bathroom as needed  Wearing brief  Ongoing Plan of Care  Plan of care and expected time for test and results reviewed with pt.     Pain Management / Comfort  [  ] dimmed lights  [  ] warm blanket provided  [  ] pain assessed  [ x ] monitor alarms reviewed  Wife and daughter at bedside

## 2017-07-02 NOTE — ED NOTES
Hourly rounding complete. Safety  Pt resting   [ x ]  On stretcher with side rails up and bed in locked position, call bell within reach  [  ]  Sitting in chair with casters locked, call bell within reach    Toileting  Pt is wearing a brief     Ongoing Plan of Care  Plan of care and expected time for test and results reviewed with pt.     Pain Management / Comfort  [  ] dimmed lights  [ x ] warm blanket provided  [x  ] pain assessed  [  ] monitor alarms

## 2017-07-02 NOTE — ED NOTES
Hourly rounding complete. Safety  Pt resting   [  ]x  On stretcher with side rails up and bed in locked position, call bell within reach  [  ]  Sitting in chair with casters locked, call bell within reach    Toileting  [ x ] pt denies need to use bathroom  [  ] pt assisted to bathroom  [  ] pt assisted with bedpan  [  ] pt independent to bathroom as needed    Ongoing Plan of Care  Plan of care and expected time for test and results reviewed with pt.     Pain Management / Comfort  [  ] dimmed lights  [  ] warm blanket provided  [ x ] pain assessed  [  ] monitor alarms reviewed  Daughter and wife at bedside

## 2017-07-02 NOTE — ED NOTES
Hourly rounding complete. Safety  Pt resting   [ x ]  On stretcher with side rails up and bed in locked position, call bell within reach  [  ]  Sitting in chair with casters locked, call bell within reach    Toileting  [  ] pt denies need to use bathroom  [  ] pt assisted to bathroom  [  ] pt assisted with bedpan  [  ] pt independent to bathroom as needed  Wearing brief  Ongoing Plan of Care  Plan of care and expected time for test and results reviewed with pt.     Pain Management / Comfort  [  ] dimmed lights  [  ] warm blanket provided  [  ] pain assessed  [  x] monitor alarms reviewed  Daughter and wife at bedside

## 2017-07-02 NOTE — DISCHARGE INSTRUCTIONS
Dementia: Care Instructions  Your Care Instructions  Dementia is a loss of mental skills that affects your daily life. It is different than the occasional trouble with memory that is part of aging. You may find it hard to remember things that you feel you should be able to remember. Or you may feel that your mind is just not working as well as usual.  Finding out that you have dementia is a shock. You may be afraid and worried about how the condition will change your life. Although there is no cure at this time, medicine may slow memory loss and improve thinking for a while. Other medicines may be able to help you sleep or cope with depression and behavior changes. Dementia often gets worse slowly. But it can get worse quickly. As dementia gets worse, it may become harder to do common things that take planning, like making a list and going shopping. Over time, the disease may make it hard for you to take care of yourself. Some people with dementia need others to help care for them. Dementia is different for everyone. You may be able to function well for a long time. In the early stage of the condition, you can do things at home to make life easier and safer. You also can keep doing your hobbies and other activities. Many people find comfort in planning now for their future needs. Follow-up care is a key part of your treatment and safety. Be sure to make and go to all appointments, and call your doctor if you are having problems. Its also a good idea to know your test results and keep a list of the medicines you take. How can you care for yourself at home? · Take your medicines exactly as prescribed. Call your doctor if you think you are having a problem with your medicine. · Eat healthy foods. Eat lots of whole grains, fruits, and vegetables every day.  If you are not hungry, try snacks or nutritional drinks such as Boost, Ensure, or Sustacal.  · If you have problems sleeping:  ¨ Try not to nap too close to your bedtime. ¨ Exercise regularly. Walking is a good choice. ¨ Try a glass of warm milk or caffeine-free herbal tea before bed. · Do tasks and activities during the time of day when you feel your best. It may help to develop a daily routine. · Post labels, lists, and sticky notes to help you remember things. Write your activities on a calendar you can easily find. Put your clock where you can easily see it. · Stay active. Take walks in familiar places, or with friends or loved ones. Try to stay active mentally too. Read and work crossword puzzles if you enjoy these activities. · Do not drive unless you can pass an on-road driving test. If you are not sure if you are safe to drive, your state s license bureau can test you. · Keep a cordless phone and a flashlight with new batteries by your bed. If possible, put a phone in each of the main rooms of your house, or carry a cell phone in case you fall and cannot reach a phone. Or, you can wear a device around your neck or wrist. You push a button that sends a signal for help. Acknowledge your emotions and plan for the future  · Talk openly and honestly with your doctor. · Let yourself grieve. It is common to feel angry, scared, frustrated, anxious, or depressed. · Get emotional support from family, friends, a support group, or a counselor experienced in working with people who have dementia. · Ask for help if you need it. · Plan for the future. ¨ Talk to your family and doctor about preparing a living will and other important papers while you can make decisions. These papers tell your doctors how to care for you at the end of your life. ¨ Consider naming a person to make decisions about your care if you are not able to. When should you call for help? Call 911 anytime you think you may need emergency care. For example, call if:  · You are lost and do not know whom to call. · You are injured and do not know whom to call.   Call your doctor now or seek immediate medical care if:  · You are more confused or upset than usual.  · You feel like you could hurt yourself because your mind is not working well. Watch closely for changes in your health, and be sure to contact your doctor if you have any problems. Where can you learn more? Go to http://leela-estela.info/. Enter C200 in the search box to learn more about \"Dementia: Care Instructions. \"  Current as of: July 26, 2016  Content Version: 11.3  © 6908-3305 [employer]. Care instructions adapted under license by [employer] (which disclaims liability or warranty for this information). If you have questions about a medical condition or this instruction, always ask your healthcare professional. [address] any warranty or liability for your use of this information. Preventing Falls: Care Instructions  Your Care Instructions  Getting around your home safely can be a challenge if you have injuries or health problems that make it easy for you to fall. Loose rugs and furniture in walkways are among the dangers for many older people who have problems walking or who have poor eyesight. People who have conditions such as arthritis, osteoporosis, or dementia also have to be careful not to fall. You can make your home safer with a few simple measures. Follow-up care is a key part of your treatment and safety. Be sure to make and go to all appointments, and call your doctor if you are having problems. It's also a good idea to know your test results and keep a list of the medicines you take. How can you care for yourself at home? Taking care of yourself  · You may get dizzy if you do not drink enough water. To prevent dehydration, drink plenty of fluids, enough so that your urine is light yellow or clear like water. Choose water and other caffeine-free clear liquids.  If you have kidney, heart, or liver disease and have to limit fluids, talk with your doctor before you increase the amount of fluids you drink. · Exercise regularly to improve your strength, muscle tone, and balance. Walk if you can. Swimming may be a good choice if you cannot walk easily. · Have your vision and hearing checked each year or any time you notice a change. If you have trouble seeing and hearing, you might not be able to avoid objects and could lose your balance. · Know the side effects of the medicines you take. Ask your doctor or pharmacist whether the medicines you take can affect your balance. Sleeping pills or sedatives can affect your balance. · Limit the amount of alcohol you drink. Alcohol can impair your balance and other senses. · Ask your doctor whether calluses or corns on your feet need to be removed. If you wear loose-fitting shoes because of calluses or corns, you can lose your balance and fall. · Talk to your doctor if you have numbness in your feet. Preventing falls at home  · Remove raised doorway thresholds, throw rugs, and clutter. Repair loose carpet or raised areas in the floor. · Move furniture and electrical cords to keep them out of walking paths. · Use nonskid floor wax, and wipe up spills right away, especially on ceramic tile floors. · If you use a walker or cane, put rubber tips on it. If you use crutches, clean the bottoms of them regularly with an abrasive pad, such as steel wool. · Keep your house well lit, especially [employer], and outside walkways. Use night-lights in areas such as hallways and bathrooms. Add extra light switches or use remote switches (such as switches that go on or off when you clap your hands) to make it easier to turn lights on if you have to get up during the night. · Install sturdy handrails on stairways. · Move items in your cabinets so that the things you use a lot are on the lower shelves (about waist level). · Keep a cordless phone and a flashlight with new batteries by your bed.  If possible, put a phone in each of the main rooms of your house, or carry a cell phone in case you fall and cannot reach a phone. Or, you can wear a device around your neck or wrist. You push a button that sends a signal for help. · Wear low-heeled shoes that fit well and give your feet good support. Use footwear with nonskid soles. Check the heels and soles of your shoes for wear. Repair or replace worn heels or soles. · Do not wear socks without shoes on wood floors. · Walk on the grass when the sidewalks are slippery. If you live in an area that gets snow and ice in the winter, sprinkle salt on slippery steps and sidewalks. Preventing falls in the bath  · Install grab bars and nonskid mats inside and outside your shower or tub and near the toilet and sinks. · Use shower chairs and bath benches. · Use a hand-held shower head that will allow you to sit while showering. · Get into a tub or shower by putting the weaker leg in first. Get out of a tub or shower with your strong side first.  · Repair loose toilet seats and consider installing a raised toilet seat to make getting on and off the toilet easier. · Keep your bathroom door unlocked while you are in the shower. Where can you learn more? Go to http://leela-estela.info/. Enter 0476 [id number] 69 [id number] in the search box to learn more about \"Preventing Falls: Care Instructions. \"  Current as of: August 4, 2016  Content Version: 11.3  © 1375-9286 [employer]. Care instructions adapted under license by [employer] (which disclaims liability or warranty for this information). If you have questions about a medical condition or this instruction, always ask your healthcare professional. [address] any warranty or liability for your use of this information. Head Injury: Care Instructions  Your Care Instructions  Most injuries to the head are minor.  Bumps, cuts, and scrapes on the head and face usually heal well and can be treated the same as injuries to other parts of the body. Although it's rare, once in a while a more serious problem shows up after you are home. So it's good to be on the lookout for symptoms for a day or two. Follow-up care is a key part of your treatment and safety. Be sure to make and go to all appointments, and call your doctor if you are having problems. It's also a good idea to know your test results and keep a list of the medicines you take. How can you care for yourself at home? · Follow your doctor's instructions. He or she will tell you if you need someone to watch you closely for the next 24 hours or longer. · Take it easy for the next few days or more if you are not feeling well. · Ask your doctor when it's okay for you to go back to activities like driving a car, riding a bike, or operating machinery. When should you call for help? Call 911 anytime you think you may need emergency care. For example, call if:  · You have a seizure. · You passed out (lost consciousness). · You are confused or can't stay awake. Call your doctor now or seek immediate medical care if:  · You have new or worse vomiting. · You feel less alert. · You have new weakness or numbness in any part of your body. Watch closely for changes in your health, and be sure to contact your doctor if:  · You do not get better as expected. · You have new symptoms, such as headaches, trouble concentrating, or changes in mood. Where can you learn more? Go to http://leela-estela.info/. Enter L210 in the search box to learn more about \"Head Injury: Care Instructions. \"  Current as of: October 14, 2016  Content Version: 11.3  © 9329-3883 [employer]. Care instructions adapted under license by [email or web address] (which disclaims liability or warranty for this information).  If you have questions about a medical condition or this instruction, always ask your healthcare professional. [employer] disclaims any warranty or liability for your use of this information.

## 2017-07-02 NOTE — ED NOTES
I have reviewed discharge instructions with the patient. The patient verbalized understanding. Patient armband removed and shredded. Pt signed paper discharge instructions, all belongings removed and pt wheeled to exit. Pt was not in distress or discomfort.

## 2017-07-02 NOTE — ED NOTES
Hourly rounding complete. Safety  Pt resting   [ x ]  On stretcher with side rails up and bed in locked position, call bell within reach  [  ]  Sitting in chair with casters locked, call bell within reach    Toileting  [  ] pt denies need to use bathroom  [  ] pt assisted to bathroom  [  ] pt assisted with bedpan  [  ] pt independent to bathroom as needed  Changed brief   Ongoing Plan of Care  Plan of care and expected time for test and results reviewed with pt.     Pain Management / Comfort  [  ] dimmed lights  [  ] warm blanket provided  [  ] pain assessed  [ x ] monitor alarms reviewed

## 2017-07-04 ENCOUNTER — HOSPITAL ENCOUNTER (INPATIENT)
Age: 82
LOS: 4 days | Discharge: NURSING, REHAB OR LONG-TERM CARE | DRG: 556 | End: 2017-07-10
Attending: EMERGENCY MEDICINE | Admitting: FAMILY MEDICINE
Payer: MEDICARE

## 2017-07-04 ENCOUNTER — APPOINTMENT (OUTPATIENT)
Dept: GENERAL RADIOLOGY | Age: 82
DRG: 556 | End: 2017-07-04
Attending: EMERGENCY MEDICINE
Payer: MEDICARE

## 2017-07-04 DIAGNOSIS — N17.9 AKI (ACUTE KIDNEY INJURY) (HCC): ICD-10-CM

## 2017-07-04 DIAGNOSIS — S20.212D RIB CONTUSION, LEFT, SUBSEQUENT ENCOUNTER: ICD-10-CM

## 2017-07-04 DIAGNOSIS — T67.5XXA HEAT EXHAUSTION, INITIAL ENCOUNTER: Primary | ICD-10-CM

## 2017-07-04 DIAGNOSIS — E86.0 DEHYDRATION: ICD-10-CM

## 2017-07-04 PROBLEM — F03.90 DEMENTIA (HCC): Status: ACTIVE | Noted: 2017-07-04

## 2017-07-04 PROBLEM — S20.219A RIB CONTUSION: Status: ACTIVE | Noted: 2017-07-04

## 2017-07-04 LAB
ANION GAP BLD CALC-SCNC: 17 MMOL/L (ref 10–20)
ANION GAP BLD CALC-SCNC: 9 MMOL/L (ref 3–18)
APPEARANCE UR: CLEAR
BACTERIA URNS QL MICRO: ABNORMAL /HPF
BASOPHILS # BLD AUTO: 0 K/UL (ref 0–0.06)
BASOPHILS # BLD: 0 % (ref 0–2)
BILIRUB UR QL: NEGATIVE
BUN BLD-MCNC: 42 MG/DL (ref 7–18)
BUN SERPL-MCNC: 45 MG/DL (ref 7–18)
BUN/CREAT SERPL: 24 (ref 12–20)
CA-I BLD-MCNC: 1.1 MMOL/L (ref 1.12–1.32)
CALCIUM SERPL-MCNC: 8.3 MG/DL (ref 8.5–10.1)
CHLORIDE BLD-SCNC: 103 MMOL/L (ref 100–108)
CHLORIDE SERPL-SCNC: 104 MMOL/L (ref 100–108)
CO2 BLD-SCNC: 27 MMOL/L (ref 19–24)
CO2 SERPL-SCNC: 28 MMOL/L (ref 21–32)
COLOR UR: ABNORMAL
CREAT SERPL-MCNC: 1.86 MG/DL (ref 0.6–1.3)
CREAT UR-MCNC: 1.7 MG/DL (ref 0.6–1.3)
DIFFERENTIAL METHOD BLD: ABNORMAL
EOSINOPHIL # BLD: 0 K/UL (ref 0–0.4)
EOSINOPHIL NFR BLD: 1 % (ref 0–5)
EPITH CASTS URNS QL MICRO: ABNORMAL /LPF (ref 0–5)
ERYTHROCYTE [DISTWIDTH] IN BLOOD BY AUTOMATED COUNT: 12.9 % (ref 11.6–14.5)
GLUCOSE BLD STRIP.AUTO-MCNC: 82 MG/DL (ref 74–106)
GLUCOSE SERPL-MCNC: 96 MG/DL (ref 74–99)
GLUCOSE UR STRIP.AUTO-MCNC: NEGATIVE MG/DL
HCT VFR BLD AUTO: 36.2 % (ref 36–48)
HCT VFR BLD CALC: 37 % (ref 36–49)
HGB BLD-MCNC: 12.1 G/DL (ref 13–16)
HGB BLD-MCNC: 12.6 G/DL (ref 12–16)
HGB UR QL STRIP: ABNORMAL
HYALINE CASTS URNS QL MICRO: ABNORMAL /LPF (ref 0–2)
KETONES UR QL STRIP.AUTO: 40 MG/DL
LEUKOCYTE ESTERASE UR QL STRIP.AUTO: NEGATIVE
LYMPHOCYTES # BLD AUTO: 9 % (ref 21–52)
LYMPHOCYTES # BLD: 0.7 K/UL (ref 0.9–3.6)
MCH RBC QN AUTO: 30.6 PG (ref 24–34)
MCHC RBC AUTO-ENTMCNC: 33.4 G/DL (ref 31–37)
MCV RBC AUTO: 91.4 FL (ref 74–97)
MONOCYTES # BLD: 0.6 K/UL (ref 0.05–1.2)
MONOCYTES NFR BLD AUTO: 8 % (ref 3–10)
NEUTS SEG # BLD: 6.1 K/UL (ref 1.8–8)
NEUTS SEG NFR BLD AUTO: 82 % (ref 40–73)
NITRITE UR QL STRIP.AUTO: NEGATIVE
PH UR STRIP: 5 [PH] (ref 5–8)
PLATELET # BLD AUTO: 82 K/UL (ref 135–420)
PMV BLD AUTO: 11.2 FL (ref 9.2–11.8)
POTASSIUM BLD-SCNC: 3.9 MMOL/L (ref 3.5–5.5)
POTASSIUM SERPL-SCNC: 3.6 MMOL/L (ref 3.5–5.5)
PROT UR STRIP-MCNC: 30 MG/DL
RBC # BLD AUTO: 3.96 M/UL (ref 4.7–5.5)
RBC #/AREA URNS HPF: ABNORMAL /HPF (ref 0–5)
SODIUM BLD-SCNC: 142 MMOL/L (ref 136–145)
SODIUM SERPL-SCNC: 141 MMOL/L (ref 136–145)
SP GR UR REFRACTOMETRY: 1.03 (ref 1–1.03)
UROBILINOGEN UR QL STRIP.AUTO: 1 EU/DL (ref 0.2–1)
WBC # BLD AUTO: 7.5 K/UL (ref 4.6–13.2)
WBC URNS QL MICRO: ABNORMAL /HPF (ref 0–4)

## 2017-07-04 PROCEDURE — 74011000250 HC RX REV CODE- 250: Performed by: INTERNAL MEDICINE

## 2017-07-04 PROCEDURE — 71100 X-RAY EXAM RIBS UNI 2 VIEWS: CPT

## 2017-07-04 PROCEDURE — 74011250636 HC RX REV CODE- 250/636: Performed by: EMERGENCY MEDICINE

## 2017-07-04 PROCEDURE — 80047 BASIC METABLC PNL IONIZED CA: CPT

## 2017-07-04 PROCEDURE — 74011250636 HC RX REV CODE- 250/636: Performed by: INTERNAL MEDICINE

## 2017-07-04 PROCEDURE — 81001 URINALYSIS AUTO W/SCOPE: CPT | Performed by: EMERGENCY MEDICINE

## 2017-07-04 PROCEDURE — 80048 BASIC METABOLIC PNL TOTAL CA: CPT | Performed by: EMERGENCY MEDICINE

## 2017-07-04 PROCEDURE — 85025 COMPLETE CBC W/AUTO DIFF WBC: CPT | Performed by: EMERGENCY MEDICINE

## 2017-07-04 PROCEDURE — 99218 HC RM OBSERVATION: CPT

## 2017-07-04 PROCEDURE — 74011250637 HC RX REV CODE- 250/637: Performed by: EMERGENCY MEDICINE

## 2017-07-04 PROCEDURE — 71010 XR CHEST PORT: CPT

## 2017-07-04 PROCEDURE — 99285 EMERGENCY DEPT VISIT HI MDM: CPT

## 2017-07-04 PROCEDURE — 96360 HYDRATION IV INFUSION INIT: CPT

## 2017-07-04 PROCEDURE — 96361 HYDRATE IV INFUSION ADD-ON: CPT

## 2017-07-04 PROCEDURE — 74011250637 HC RX REV CODE- 250/637: Performed by: INTERNAL MEDICINE

## 2017-07-04 RX ORDER — ACETAMINOPHEN AND CODEINE PHOSPHATE 300; 30 MG/1; MG/1
1 TABLET ORAL
Status: COMPLETED | OUTPATIENT
Start: 2017-07-04 | End: 2017-07-04

## 2017-07-04 RX ORDER — OXYCODONE AND ACETAMINOPHEN 5; 325 MG/1; MG/1
1 TABLET ORAL
Status: DISCONTINUED | OUTPATIENT
Start: 2017-07-04 | End: 2017-07-10 | Stop reason: HOSPADM

## 2017-07-04 RX ORDER — METOPROLOL TARTRATE 25 MG/1
25 TABLET, FILM COATED ORAL 2 TIMES DAILY
Status: DISCONTINUED | OUTPATIENT
Start: 2017-07-04 | End: 2017-07-10 | Stop reason: HOSPADM

## 2017-07-04 RX ORDER — LATANOPROST 50 UG/ML
1 SOLUTION/ DROPS OPHTHALMIC
Status: DISCONTINUED | OUTPATIENT
Start: 2017-07-04 | End: 2017-07-10 | Stop reason: HOSPADM

## 2017-07-04 RX ORDER — FLUTICASONE PROPIONATE 50 MCG
2 SPRAY, SUSPENSION (ML) NASAL DAILY
Status: DISCONTINUED | OUTPATIENT
Start: 2017-07-05 | End: 2017-07-10 | Stop reason: HOSPADM

## 2017-07-04 RX ORDER — SIMVASTATIN 20 MG/1
20 TABLET, FILM COATED ORAL
Status: DISCONTINUED | OUTPATIENT
Start: 2017-07-04 | End: 2017-07-10 | Stop reason: HOSPADM

## 2017-07-04 RX ORDER — CITALOPRAM 20 MG/1
20 TABLET, FILM COATED ORAL DAILY
Status: DISCONTINUED | OUTPATIENT
Start: 2017-07-05 | End: 2017-07-10 | Stop reason: HOSPADM

## 2017-07-04 RX ORDER — MECLIZINE HCL 12.5 MG 12.5 MG/1
25 TABLET ORAL
Status: DISCONTINUED | OUTPATIENT
Start: 2017-07-04 | End: 2017-07-10 | Stop reason: HOSPADM

## 2017-07-04 RX ORDER — SODIUM CHLORIDE 9 MG/ML
75 INJECTION, SOLUTION INTRAVENOUS CONTINUOUS
Status: DISCONTINUED | OUTPATIENT
Start: 2017-07-04 | End: 2017-07-07

## 2017-07-04 RX ORDER — HEPARIN SODIUM 5000 [USP'U]/ML
5000 INJECTION, SOLUTION INTRAVENOUS; SUBCUTANEOUS EVERY 8 HOURS
Status: DISCONTINUED | OUTPATIENT
Start: 2017-07-04 | End: 2017-07-10 | Stop reason: HOSPADM

## 2017-07-04 RX ADMIN — HEPARIN SODIUM 5000 UNITS: 5000 INJECTION, SOLUTION INTRAVENOUS; SUBCUTANEOUS at 22:37

## 2017-07-04 RX ADMIN — METOPROLOL TARTRATE 25 MG: 25 TABLET ORAL at 22:37

## 2017-07-04 RX ADMIN — SODIUM CHLORIDE 500 ML: 900 INJECTION, SOLUTION INTRAVENOUS at 14:59

## 2017-07-04 RX ADMIN — ACETAMINOPHEN AND CODEINE PHOSPHATE 1 TABLET: 300; 30 TABLET ORAL at 17:39

## 2017-07-04 RX ADMIN — LATANOPROST 1 DROP: 50 SOLUTION/ DROPS OPHTHALMIC at 23:47

## 2017-07-04 RX ADMIN — SIMVASTATIN 20 MG: 20 TABLET, FILM COATED ORAL at 22:37

## 2017-07-04 RX ADMIN — SODIUM CHLORIDE 1000 ML: 900 INJECTION, SOLUTION INTRAVENOUS at 13:07

## 2017-07-04 RX ADMIN — SODIUM CHLORIDE 100 ML/HR: 900 INJECTION, SOLUTION INTRAVENOUS at 18:55

## 2017-07-04 NOTE — ED TRIAGE NOTES
Patient arrived via EMS due to bystander saw patient sitting in car, acting confused. Patient states he went to the store for a drink/snack and when he got home he couldn't get out of the car because his foot was stuck; states he was sitting in the heat for about an hour. When asked how he knew how long it had been patient states, \"thats how long it felt\". Per medic, initial BP 70/40s --> -120 after 100ML. Glu 117. Patient was seen this morning for a fall.   Did not take his medication this morning  Patient was slow to respond, diaphoretic  90 degrees outside

## 2017-07-04 NOTE — ED PROVIDER NOTES
HPI Comments: 12:59 PM Ashok Mora. is a 80 y.o. male with a history of CKD, DVT, CAD s/p cardiac cath, HTN, Pacemaker placement and gout who presents to the emergency department c/o heat exposure. Pt states he went to the grocery store and upon returning home was unable to get out of his vehicle secondary to his foot becoming \"stuck in to pedals\". Pt reports sitting in the car for about 1 hour, temperature is about 90 degrees outside. He is currently complaining of generalized weakness but denies any dizziness, lightheadedness, chest pain, SOB or any other symptoms at this time. Per EMS, patient systolic pressure was 70, they gave a fluid bolus, on arrival to ED BP now 114/57 with a MAP of 70. Pt does report left lower rib pain, pt was seen here recently for a fall in which he fell onto his left side. No other complaints or concerns were noted at this time. PCP: Mikael Olivier MD      The history is provided by the patient. Past Medical History:   Diagnosis Date    Abnormal myocardial perfusion study 04/12/2012    Small, mild mid to distal anterior septal defect concerning for ischemia. Mild mid to distal anterior septal hypk. EF 61%. Neg EKG on pharm stress test.  Low to intermediate risk.  Arthritis     CAD (coronary artery disease) December 2009    presented with anterior wall STEMI with subsequent 100% proximal LAD thrombotic lesion stented to residual 0% using 3.5-mm Cypher stent (13-mm length). He only had mild disease in his left main, RCA, and circumflex coronary artery.  Chronic kidney disease     Depressive disorder     Dyslipidemia     Gout     History of echocardiogram 06/25/2013    EF 55-60%. No RWMA. Normal diastolic function. Sm pericardial effusion (also noted on echo 4/26/10), without tamponade physiology.     Hypercoagulable state ([address]     DVT x 2 (L) 2/2014     Hypertension     Hypertrophy (benign) of prostate     Lower extremity venous duplex 07/05/2013 Right leg:  Acute, occlusive DVT in posterior tibial vein. Pulsatile flow.  Neuropathy     Normal ankle brachial index 01/16/2012    No significant peripheral arterial disease bilaterally. R BHANU 1.31.  L BHANU 1.31.    Pacemaker 04/27/10    Implantation of dual-chamber Medtronic pacemaker    S/P cardiac cath 12/28/2009    oRCA 30%. LM patent. CX patent. LAD p100% thrombotic (Pronto thrombectomy, 3.5 x 13 Cypher stent). LVEDP 26.  EF 60%. Min anteroapical hypk.  Sinus bradycardia     pacemaker in 2010    Thromboembolus [facility]) 2011    h/o DVT x2 to LLE during surgery for Right TKR    Venous (peripheral) insufficiency        Past Surgical History:   Procedure Laterality Date    HX COLONOSCOPY  11/2004    neg    HX HEART CATHETERIZATION  12/28/09    LAD stent for AMI 3.5mm Cypher stent (13mm length)    HX ORTHOPAEDIC      shoulder manipulation    HX ORTHOPAEDIC  6/2013    right knee replacement    HX PACEMAKER  04/27/10    Implantation of dual-chamber Medtronic pacemaker         Family History:   Problem Relation Age of Onset    Arthritis-osteo Father        Social History     Social History    Marital status:      Spouse name: N/A    Number of children: N/A    Years of education: N/A     Occupational History    retired wood worker      Social History Main Topics    Smoking status: Former Smoker     Packs/day: 1.00     Quit date: 5/21/1955    Smokeless tobacco: Never Used    Alcohol use No    Drug use: No    Sexual activity: Yes     Partners: Female     Birth control/ protection: None     Other Topics Concern    Not on file     Social History Narrative         ALLERGIES: Sulfa (sulfonamide antibiotics)    Review of Systems   Constitutional: Negative for fever. HENT: Negative for sore throat. Eyes: Negative for redness and visual disturbance. Respiratory: Negative for shortness of breath and wheezing. Cardiovascular: Negative for chest pain.    Gastrointestinal: Negative for abdominal pain and nausea. Endocrine: Negative for polyuria. Genitourinary: Negative for dysuria. Musculoskeletal: Positive for myalgias (Left lower rib). Negative for arthralgias and neck stiffness. Skin: Negative for rash. Neurological: Positive for weakness (generalized). Negative for headaches. All other systems reviewed and are negative. Vitals:    07/04/17 1257   BP: 114/57   Pulse: 70   Resp: 19   Temp: 98.3 °F (36.8 °C)   SpO2: 95%   Weight: 91.5 kg (201 lb 12.8 oz)   Height: 5' 8\" (1.727 m)            Physical Exam   Constitutional: He is oriented to person, place, and time. He appears well-developed and well-nourished. No distress. HENT:   Head: Normocephalic and atraumatic. Mouth/Throat: Oropharynx is clear and moist.   Small old bruise to forehead (from recent fall)   Eyes: Conjunctivae are normal. Pupils are equal, round, and reactive to light. No scleral icterus. Neck: Normal range of motion. Neck supple. Cardiovascular: Intact distal pulses. Capillary refill < 3 seconds   Pulmonary/Chest: Effort normal and breath sounds normal. No respiratory distress. He has no wheezes. Left sided rib tenderness from a fall yesterday. Abdominal: Soft. Bowel sounds are normal. He exhibits no distension. There is no tenderness. Musculoskeletal: Normal range of motion. He exhibits no edema. Full ROM of upper and lower extremities bilaterally. Lymphadenopathy:     He has no cervical adenopathy. Neurological: He is alert and oriented to person, place, and time. No cranial nerve deficit. No cerebellar ataxia on finger to nose test. Strength 5/5 in bilateral upper and lower extremities. No slurred speech. Sensation intact. EOMI. Skin: Skin is warm and dry. He is not diaphoretic. Psychiatric: Thought content normal. His speech is not slurred. Nursing note and vitals reviewed.        MDM  Number of Diagnoses or Management Options  RANDALL (acute kidney injury) ([facility] [address]): Dehydration:   Heat exhaustion, initial encounter:   Rib contusion, left, subsequent encounter:   Diagnosis management comments: Hx dementia  Was stuck in his car in the heat, couldn't get his foot untangled from pedals. ddx heat exhaustion, dehydration, metabolic, rib fracture vs contusion from recent fall     Labs, IVF, UA, xray  Analgesia    Bun 45  Cr 1.85    Does has hx of renal insufficiency but this Cr is worsened with this heat exhaustion occurrence    Will recheck Cr after IVF's    Even after multiple IVF boluses Bun 42, Cr 1.7. I feel this pleasant pt with hx dementia should get obs admission and IVF. I discussed with hospitalist who agrees         Amount and/or Complexity of Data Reviewed  Clinical lab tests: ordered and reviewed  Tests in the radiology section of CPT®: ordered and reviewed  Tests in the medicine section of CPT®: ordered and reviewed  Discussion of test results with the performing providers: yes  Review and summarize past medical records: yes  Discuss the patient with other providers: yes  Independent visualization of images, tracings, or specimens: yes    Risk of Complications, Morbidity, and/or Mortality  Presenting problems: high  Diagnostic procedures: moderate  Management options: moderate    Patient Progress  Patient progress: stable    ED Course       Procedures    PROGRESS NOTES  1:12 PM: Dalton Dick DO arrives to the bedside to evaluate the patient. Answered the patient's questions regarding the treatment plan. CONSULTATIONS  Consult:  Discussed care with Dr. Samm Schreiber, Standard discussion; including history of patients chief complaint, available diagnostic results, and treatment course. Accepts the patient for admission.          ED PHYSICIAN ORDERS  Orders Placed This Encounter    XR CHEST PORT     Standing Status:   Standing     Number of Occurrences:   1     Order Specific Question:   Reason for Exam     Answer:   recent fall; left side anterolateral ribcage pain    XR RIBS LT UNI 2 V     No fx seen on CxR; radiologist recommends rib series     Standing Status:   Standing     Number of Occurrences:   1     Order Specific Question:   Transport     Answer:   Stretcher [5]     Order Specific Question:   Reason for Exam     Answer:   fall; pain; left sided rib pain    CBC WITH AUTOMATED DIFF     Standing Status:   Standing     Number of Occurrences:   1    METABOLIC PANEL, BASIC     Standing Status:   Standing     Number of Occurrences:   1    URINALYSIS W/ RFLX MICROSCOPIC     Standing Status:   Standing     Number of Occurrences:   1    URINE MICROSCOPIC ONLY     Standing Status:   Standing     Number of Occurrences:   1    CARDIAC MONITORING     Standing Status:   Standing     Number of Occurrences:   1     Order Specific Question:   Type: Answer:   Bedside    POC CHEM8     Standing Status:   Standing     Number of Occurrences:   1    POC CHEM8     Standing Status:   Standing     Number of Occurrences:   1    INSERT PERIPHERAL IV ONE TIME STAT     Standing Status:   Standing     Number of Occurrences:   1    sodium chloride 0.9 % bolus infusion 1,000 mL    sodium chloride 0.9 % bolus infusion 500 mL    acetaminophen-codeine (TYLENOL #3) per tablet 1 Tab    DISCONTD: sodium chloride 0.9 % bolus infusion 500 mL    0.9% sodium chloride infusion           MEDICATIONS ORDERED  Medications   0.9% sodium chloride infusion (not administered)   sodium chloride 0.9 % bolus infusion 1,000 mL (0 mL IntraVENous IV Completed 7/4/17 1445)   sodium chloride 0.9 % bolus infusion 500 mL (0 mL IntraVENous IV Completed 7/4/17 1559)   acetaminophen-codeine (TYLENOL #3) per tablet 1 Tab (1 Tab Oral Given 7/4/17 1739)         Vitals:  Patient Vitals for the past 12 hrs:   Temp Pulse Resp BP SpO2   07/04/17 1257 98.3 °F (36.8 °C) 70 19 114/57 95 %   95 %. Percentage is within normal limits.            RADIOLOGY INTERPRETATIONS  XR CHEST PORT   Final Result:  IMPRESSION:     No acute abnormalities.     No acute rib injury is seen. As clinically warranted, if pain persists or there  is point tenderness and high clinical suspicion for fracture, could correlate  with a dedicated rib series as a more sensitive test for detection of rib  abnormalities.     Likely chronic tendinosis at the right rotator cuff. per radiology               LABORATORY RESULTS  Labs Reviewed   CBC WITH AUTOMATED DIFF - Abnormal; Notable for the following:        Result Value    RBC 3.96 (*)     HGB 12.1 (*)     PLATELET 82 (*)     NEUTROPHILS 82 (*)     LYMPHOCYTES 9 (*)     ABS. LYMPHOCYTES 0.7 (*)     All other components within normal limits   METABOLIC PANEL, BASIC - Abnormal; Notable for the following:     BUN 45 (*)     Creatinine 1.86 (*)     BUN/Creatinine ratio 24 (*)     GFR est AA 42 (*)     GFR est non-AA 35 (*)     Calcium 8.3 (*)     All other components within normal limits   URINALYSIS W/ RFLX MICROSCOPIC - Abnormal; Notable for the following:     Protein 30 (*)     Ketone 40 (*)     Blood MODERATE (*)     All other components within normal limits   URINE MICROSCOPIC ONLY - Abnormal; Notable for the following:     Bacteria FEW (*)     All other components within normal limits   POC CHEM8 - Abnormal; Notable for the following:     CO2, POC 27 (*)     BUN, POC 42 (*)     Creatinine, POC 1.7 (*)     GFRAA, POC 47 (*)     GFRNA, POC 39 (*)     Calcium, ionized (POC) 1.10 (*)     All other components within normal limits   POC CHEM8             EKG interpretation by ED Dr. Borrero Knee        ED DIAGNOSIS & DISPOSITION INFORMATION  Diagnosis:   1. Heat exhaustion, initial encounter    2. Dehydration    3. Rib contusion, left, subsequent encounter    4. RANDALL (acute kidney injury) ([facility] [address]          Disposition: admitted    Follow-up Information     None          Patient's Medications   Start Taking    No medications on file   Continue Taking    ASPIRIN DELAYED-RELEASE 81 MG TABLET    Take 1 Tab by mouth daily. CITALOPRAM (CELEXA) 20 MG TABLET    Take  by mouth daily. FLUTICASONE (FLONASE) 50 MCG/ACTUATION NASAL SPRAY    use in each nostril    GUAIFENESIN-CODEINE (CHERATUSSIN AC) 100-10 MG/5 ML SOLUTION    1 or 2 tsp q 6 hrs prn cough    LATANOPROST (XALATAN) 0.005 % OPHTHALMIC SOLUTION    Administer 1 Drop to both eyes nightly. LOSARTAN-HYDROCHLOROTHIAZIDE (HYZAAR) 50-12.5 MG PER TABLET    Take 1 Tab by mouth daily. MECLIZINE (ANTIVERT) 25 MG TABLET    Take 1 Tab by mouth three (3) times daily as needed for Dizziness. METOPROLOL TARTRATE (LOPRESSOR) 25 MG TABLET    Take  by mouth two (2) times a day. SIMVASTATIN (ZOCOR) 20 MG TABLET    Take 1 Tab by mouth nightly. TRAMADOL (ULTRAM) 50 MG TABLET    1 or 2 po tid prn pain   These Medications have changed    No medications on file   Stop Taking    No medications on file       Scribe Attestation:     Mikki Ramirez, scribing for and in the presence of Jerica Armstrong DO July 04, 2017 at 6:41 PM     Signed by: Derick Valdes July 04, 2017, 6:41 PM      Physician Attestation:   I personally performed the services described in this documentation, reviewed and edited the documentation which was dictated to the scribe in my presence, and it accurately records my words and actions.  Jerica Armstrong DO  July 04, 2017 at 6:41 PM

## 2017-07-04 NOTE — ROUTINE PROCESS
TRANSFER - OUT REPORT:    Verbal report given to Lance Hernandez RN (name) on Conchita Simmonds.  being transferred to [facility] (unit) for routine progression of care       Report consisted of patients Situation, Background, Assessment and   Recommendations(SBAR). Information from the following report(s) SBAR, ED Summary and MAR was reviewed with the receiving nurse. Lines:   Peripheral IV 07/04/17 Left Hand (Active)   Site Assessment Clean, dry, & intact 7/4/2017  1:04 PM   Phlebitis Assessment 0 7/4/2017  1:04 PM   Infiltration Assessment 0 7/4/2017  1:04 PM   Dressing Status Clean, dry, & intact 7/4/2017  1:04 PM   Dressing Type Transparent 7/4/2017  1:04 PM   Hub Color/Line Status Flushed;Patent 7/4/2017  1:04 PM   Action Taken Blood drawn 7/4/2017  1:04 PM        Opportunity for questions and clarification was provided.       Patient transported with:   Monitor  Registered Nurse

## 2017-07-05 LAB
ALBUMIN SERPL BCP-MCNC: 2.9 G/DL (ref 3.4–5)
ALBUMIN/GLOB SERPL: 1 {RATIO} (ref 0.8–1.7)
ALP SERPL-CCNC: 65 U/L (ref 45–117)
ALT SERPL-CCNC: 19 U/L (ref 16–61)
ANION GAP BLD CALC-SCNC: 7 MMOL/L (ref 3–18)
AST SERPL W P-5'-P-CCNC: 25 U/L (ref 15–37)
BILIRUB SERPL-MCNC: 1 MG/DL (ref 0.2–1)
BUN SERPL-MCNC: 34 MG/DL (ref 7–18)
BUN/CREAT SERPL: 28 (ref 12–20)
CALCIUM SERPL-MCNC: 7.6 MG/DL (ref 8.5–10.1)
CHLORIDE SERPL-SCNC: 107 MMOL/L (ref 100–108)
CO2 SERPL-SCNC: 28 MMOL/L (ref 21–32)
CREAT SERPL-MCNC: 1.22 MG/DL (ref 0.6–1.3)
GLOBULIN SER CALC-MCNC: 2.9 G/DL (ref 2–4)
GLUCOSE SERPL-MCNC: 75 MG/DL (ref 74–99)
POTASSIUM SERPL-SCNC: 3.3 MMOL/L (ref 3.5–5.5)
PROT SERPL-MCNC: 5.8 G/DL (ref 6.4–8.2)
SODIUM SERPL-SCNC: 142 MMOL/L (ref 136–145)

## 2017-07-05 PROCEDURE — 74011250636 HC RX REV CODE- 250/636: Performed by: INTERNAL MEDICINE

## 2017-07-05 PROCEDURE — G8978 MOBILITY CURRENT STATUS: HCPCS

## 2017-07-05 PROCEDURE — 36415 COLL VENOUS BLD VENIPUNCTURE: CPT | Performed by: INTERNAL MEDICINE

## 2017-07-05 PROCEDURE — 74011250637 HC RX REV CODE- 250/637: Performed by: INTERNAL MEDICINE

## 2017-07-05 PROCEDURE — 97161 PT EVAL LOW COMPLEX 20 MIN: CPT

## 2017-07-05 PROCEDURE — 74011250636 HC RX REV CODE- 250/636: Performed by: EMERGENCY MEDICINE

## 2017-07-05 PROCEDURE — G8979 MOBILITY GOAL STATUS: HCPCS

## 2017-07-05 PROCEDURE — 97530 THERAPEUTIC ACTIVITIES: CPT

## 2017-07-05 PROCEDURE — 80053 COMPREHEN METABOLIC PANEL: CPT | Performed by: INTERNAL MEDICINE

## 2017-07-05 PROCEDURE — 99218 HC RM OBSERVATION: CPT

## 2017-07-05 RX ADMIN — METOPROLOL TARTRATE 25 MG: 25 TABLET ORAL at 09:25

## 2017-07-05 RX ADMIN — SODIUM CHLORIDE 100 ML/HR: 900 INJECTION, SOLUTION INTRAVENOUS at 04:52

## 2017-07-05 RX ADMIN — CITALOPRAM HYDROBROMIDE 20 MG: 20 TABLET ORAL at 09:25

## 2017-07-05 RX ADMIN — HEPARIN SODIUM 5000 UNITS: 5000 INJECTION, SOLUTION INTRAVENOUS; SUBCUTANEOUS at 16:24

## 2017-07-05 RX ADMIN — FLUTICASONE PROPIONATE 2 SPRAY: 50 SPRAY, METERED NASAL at 09:29

## 2017-07-05 RX ADMIN — METOPROLOL TARTRATE 25 MG: 25 TABLET ORAL at 18:41

## 2017-07-05 RX ADMIN — HEPARIN SODIUM 5000 UNITS: 5000 INJECTION, SOLUTION INTRAVENOUS; SUBCUTANEOUS at 06:00

## 2017-07-05 RX ADMIN — SIMVASTATIN 20 MG: 20 TABLET, FILM COATED ORAL at 21:45

## 2017-07-05 NOTE — PROGRESS NOTES
[facility]    Patient: Khanh Tomas. Age: 80 y.o. : 1932 MR#: [id number] SSN: [id number]  Date/Time: 2017 3:11 PM    Subjective:     Feels weak. No F/C, N/V, CP, SOB. Reports having mechanical fall day prior to presentation, uses walker and cane at baseline. Assessment/Plan:   1. Dehydration, heat exhaustion, RANDALL - resolved with fluids. Continue gentle hydration. 2. Rib contusion - no fx seen on plain films. Pain control ICS. 3. Mechanical fall - precautions. PT/OT/Dispo planning. 4. Dementia - by hx. No acute. Additional Notes:      Case discussed with:  [x]Patient  []Family  []Nursing  []Case Management  DVT Prophylaxis:  []Lovenox  [x]Hep SQ  []SCDs  []Coumadin   []On Heparin gtt    Objective:   VS:   Visit Vitals    /78 (BP 1 Location: Right arm, BP Patient Position: At rest)    Pulse (!) 59    Temp 97.8 °F (36.6 °C)    Resp 13    Ht 5' 8\" (1.727 m)    Wt 89.5 kg (197 lb 6.4 oz)    SpO2 99%    BMI 30.01 kg/m2      Tmax/24hrs: Temp (24hrs), Av.8 °F (36.6 °C), Min:97.4 °F (36.3 °C), Max:98.4 °F (36.9 °C)    Intake/Output Summary (Last 24 hours) at 17 1511  Last data filed at 17 0610   Gross per 24 hour   Intake              170 ml   Output              450 ml   Net             -280 ml       General:  Awake, alert, NAD. Cardiovascular:  RRR. Pulmonary:  CTA B.  GI:  Soft, NT/ND, NABS. Extremities:  No CT or edema.    Additional:      Labs:    Recent Results (from the past 24 hour(s))   URINALYSIS W/ RFLX MICROSCOPIC    Collection Time: 17  3:37 PM   Result Value Ref Range    Color DARK YELLOW      Appearance CLEAR      Specific gravity 1.026 1.005 - 1.030      pH (UA) 5.0 5.0 - 8.0      Protein 30 (A) NEG mg/dL    Glucose NEGATIVE  NEG mg/dL    Ketone 40 (A) NEG mg/dL    Bilirubin NEGATIVE  NEG      Blood MODERATE (A) NEG      Urobilinogen 1.0 0.2 - 1.0 EU/dL    Nitrites NEGATIVE  NEG Leukocyte Esterase NEGATIVE  NEG     URINE MICROSCOPIC ONLY    Collection Time: 07/04/17  3:37 PM   Result Value Ref Range    WBC 0 to 2 0 - 4 /hpf    RBC 1 to 4 0 - 5 /hpf    Epithelial cells 1+ 0 - 5 /lpf    Bacteria FEW (A) NEG /hpf    Hyaline cast 3 to 4 0 - 2 /lpf   POC CHEM8    Collection Time: 07/04/17  5:20 PM   Result Value Ref Range    CO2, POC 27 (H) 19 - 24 MMOL/L    Glucose, POC 82 74 - 106 MG/DL    BUN, POC 42 (H) 7 - 18 MG/DL    Creatinine, POC 1.7 (H) 0.6 - 1.3 MG/DL    GFRAA, POC 47 (L) >60 ml/min/1.73m2    GFRNA, POC 39 (L) >60 ml/min/1.73m2    Sodium,  136 - 145 MMOL/L    Potassium, POC 3.9 3.5 - 5.5 MMOL/L    Calcium, ionized (POC) 1.10 (L) 1.12 - 1.32 MMOL/L    Chloride,  100 - 108 MMOL/L    Anion gap, POC 17 10 - 20      Hematocrit, POC 37 36 - 49 %    Hemoglobin, POC 12.6 12 - 16 G/DL   METABOLIC PANEL, COMPREHENSIVE    Collection Time: 07/05/17  2:34 AM   Result Value Ref Range    Sodium 142 136 - 145 mmol/L    Potassium 3.3 (L) 3.5 - 5.5 mmol/L    Chloride 107 100 - 108 mmol/L    CO2 28 21 - 32 mmol/L    Anion gap 7 3.0 - 18 mmol/L    Glucose 75 74 - 99 mg/dL    BUN 34 (H) 7.0 - 18 MG/DL    Creatinine 1.22 0.6 - 1.3 MG/DL    BUN/Creatinine ratio 28 (H) 12 - 20      GFR est AA >60 >60 ml/min/1.73m2    GFR est non-AA 56 (L) >60 ml/min/1.73m2    Calcium 7.6 (L) 8.5 - 10.1 MG/DL    Bilirubin, total 1.0 0.2 - 1.0 MG/DL    ALT (SGPT) 19 16 - 61 U/L    AST (SGOT) 25 15 - 37 U/L    Alk.  phosphatase 65 45 - 117 U/L    Protein, total 5.8 (L) 6.4 - 8.2 g/dL    Albumin 2.9 (L) 3.4 - 5.0 g/dL    Globulin 2.9 2.0 - 4.0 g/dL    A-G Ratio 1.0 0.8 - 1.7         Signed By: Jacqueline Andrade MD     July 5, 2017 3:11 PM

## 2017-07-05 NOTE — PROGRESS NOTES
Spoke with daughter Amanda Reich [phone] who states, \"I did not know my dad was in the hospital until last night. \"  States that her parents live alone and that her mother also has dementia. Daughter wants to apply for DEREK for her parents. Referral placed to Deniz at [facility].

## 2017-07-05 NOTE — PROGRESS NOTES
Patient remained safe this shift since arriving from ED. Patient is alert and oriented X 3. Vital signs remained stable. Patient denied of pain when asked. Encouraged patient to turn and reposition as needed. Assisted with activities. No other concerns voiced. Will continue with plan of care.

## 2017-07-05 NOTE — H&P
History & Physical    Patient: Suzy Stauffer MRN: [id number]  CSN: [id number]    YOB: 1932  Age: 80 y.o. Sex: male      DOA: 7/4/2017    Chief Complaint:   Chief Complaint   Patient presents with   Lawanda [place] Exposure          HPI:     Suzy Stauffer is a 80 y.o.  male who presented to the ER after being found in his car. Apparently he was at the grocery store when his left foot got stuck. Pt was unable to get out of the car, so he sat in the hot sun. Upon EMS arrival Pt has hypotensive SBP 70mmHg which responded to IVF bolus. While ER he remained hemodynamically stable but c/o generalized weakness. On July 1st Pt presented s/p fall. CT head was neg for acute infarct mass or lesion. His renal indices have worsened Cr=1.8. He was provided IVF hydration and admitted for observation. Past Medical History:   Diagnosis Date    Abnormal myocardial perfusion study 04/12/2012    Small, mild mid to distal anterior septal defect concerning for ischemia. Mild mid to distal anterior septal hypk. EF 61%. Neg EKG on pharm stress test.  Low to intermediate risk.  Arthritis     CAD (coronary artery disease) December 2009    presented with anterior wall STEMI with subsequent 100% proximal LAD thrombotic lesion stented to residual 0% using 3.5-mm Cypher stent (13-mm length). He only had mild disease in his left main, RCA, and circumflex coronary artery.  Chronic kidney disease     Depressive disorder     Dyslipidemia     Gout     History of echocardiogram 06/25/2013    EF 55-60%. No RWMA. Normal diastolic function. Sm pericardial effusion (also noted on echo 4/26/10), without tamponade physiology.  Hypercoagulable state ([address]     DVT x 2 (L) 2/2014     Hypertension     Hypertrophy (benign) of prostate     Lower extremity venous duplex 07/05/2013    Right leg:  Acute, occlusive DVT in posterior tibial vein. Pulsatile flow.       Neuropathy     Normal ankle brachial index 01/16/2012    No significant peripheral arterial disease bilaterally. R BHANU 1.31.  L BHANU 1.31.    Pacemaker 04/27/10    Implantation of dual-chamber Medtronic pacemaker    S/P cardiac cath 12/28/2009    oRCA 30%. LM patent. CX patent. LAD p100% thrombotic (Pronto thrombectomy, 3.5 x 13 Cypher stent). LVEDP 26.  EF 60%. Min anteroapical hypk.  Sinus bradycardia     pacemaker in 2010    Thromboembolus [facility]) 2011    h/o DVT x2 to LLE during surgery for Right TKR    Venous (peripheral) insufficiency        Past Surgical History:   Procedure Laterality Date    HX COLONOSCOPY  11/2004    neg    HX HEART CATHETERIZATION  12/28/09    LAD stent for AMI 3.5mm Cypher stent (13mm length)    HX ORTHOPAEDIC      shoulder manipulation    HX ORTHOPAEDIC  6/2013    right knee replacement    HX PACEMAKER  04/27/10    Implantation of dual-chamber Medtronic pacemaker       Family History   Problem Relation Age of Onset    Arthritis-osteo Father        Social History     Social History    Marital status:      Spouse name: N/A    Number of children: N/A    Years of education: N/A     Occupational History    retired wood worker      Social History Main Topics    Smoking status: Former Smoker     Packs/day: 1.00     Quit date: 5/21/1955    Smokeless tobacco: Never Used    Alcohol use No    Drug use: No    Sexual activity: Yes     Partners: Female     Birth control/ protection: None     Other Topics Concern    Not on file     Social History Narrative       Prior to Admission medications    Medication Sig Start Date End Date Taking? Authorizing Provider   citalopram (CELEXA) 20 mg tablet Take  by mouth daily. Historical Provider   metoprolol tartrate (LOPRESSOR) 25 mg tablet Take  by mouth two (2) times a day.     Historical Provider   guaiFENesin-codeine Breerylan Wolff) 100-10 mg/5 mL solution 1 or 2 tsp q 6 hrs prn cough 7/8/16   Irasema Adame MD   losartan-hydrochlorothiazide [facility]) 50-12.5 mg per tablet Take 1 Tab by mouth daily. 4/18/16   Flor Davila MD   traMADol Nkechi Ketan) 50 mg tablet 1 or 2 po tid prn pain 3/29/16   Flor Davila MD   aspirin delayed-release 81 mg tablet Take 1 Tab by mouth daily. 2/5/16   Raphael Woody MD   fluticasone (FLONASE) 50 mcg/actuation nasal spray use in each nostril  Patient taking differently: 2 Sprays by Both Nostrils route daily as needed. use in each nostril 2/4/16   Raphael Woody MD   meclizine (ANTIVERT) 25 mg tablet Take 1 Tab by mouth three (3) times daily as needed for Dizziness. 2/3/16   Raphael Woody MD   simvastatin (ZOCOR) 20 mg tablet Take 1 Tab by mouth nightly. 10/13/15   Flor Davila MD   latanoprost (XALATAN) 0.005 % ophthalmic solution Administer 1 Drop to both eyes nightly. Ethan Hedrick, MD       Allergies   Allergen Reactions    Sulfa (Sulfonamide Antibiotics) Other (comments)         Review of Systems  GENERAL: Patient alert, awake and oriented times 3, able to communicate full sentences and not in distress. Hyperemic faces  HEENT: No change in vision, no earache, tinnitus, sore throat or sinus congestion. NECK: No pain or stiffness. PULMONARY: No shortness of breath, cough or wheeze. Cardiovascular: no pnd or orthopnea, no CP, PPM  GASTROINTESTINAL: No abdominal pain, nausea, vomiting or diarrhea, melena or bright red blood per rectum. GENITOURINARY: No urinary frequency, urgency, hesitancy or dysuria. MUSCULOSKELETAL: No joint or muscle pain, no back pain, no recent trauma. DERMATOLOGIC: No rash, no itching, no lesions. ENDOCRINE: No polyuria, polydipsia, no heat or cold intolerance. No recent change in weight. HEMATOLOGICAL: No anemia or easy bruising or bleeding. NEUROLOGIC: No headache, seizures, numbness, tingling or weakness.        Physical Exam:     Physical Exam:  Visit Vitals    /83    Pulse 66    Temp 98.3 °F (36.8 °C)    Resp 17    Ht 5' 8\" (1.727 m)    Wt 91.5 kg (201 lb 12.8 oz)    SpO2 99%    BMI 30.68 kg/m2      O2 Device: Room air    Temp (24hrs), Av.3 °F (36.8 °C), Min:98.3 °F (36.8 °C), Max:98.3 °F (36.8 °C)             General:  Alert, cooperative, no distress, appears stated age. Head: Normocephalic, without obvious abnormality, atraumatic. Eyes:  Conjunctivae/corneas clear. PERRL, EOMs intact. Nose: Nares normal. No drainage or sinus tenderness. Neck: Supple, symmetrical, trachea midline, no adenopathy, thyroid: no enlargement, no carotid bruit and no JVD. Lungs:   Clear to auscultation bilaterally. Heart:  Regular rate and rhythm, S1, S2 normal. PPM L ACW     Abdomen: Soft, non-tender. Bowel sounds normal. Ventral hernia (reducible)   Extremities: Extremities normal, atraumatic, no cyanosis or edema. Pulses: 2+ and symmetric all extremities. Skin:  No rashes or lesions   Neurologic: AAOx3, No focal motor or sensory deficit. Labs Reviewed:    Lab results reviewed. For significant abnormal values and values requiring intervention, see assessment and plan. CXR and EKG    Procedures/imaging: see electronic medical records for all procedures/Xrays and details which were not copied into this note but were reviewed prior to creation of Plan      Assessment/Plan     Active Problems:    Dehydration (2017)      Heat exhaustion (2017)      Rib contusion (2017)      Dementia (2017)      RANDALL (acute kidney injury) ([facility] (2017)       Pt presented with heat exhaustion and ARF due to dehydration. Cont gentle IVF hydration, check BMP in am. Cont outPt medication with the exception of diuretics. Pt's chest pain s/p fall  is tx with prn Percocet. Order PT. Based on recent events, I suggest Pt discontinue driving for his safety. Anticipate d/c in am if he remains stable. No family was present during the interview.        DVT/GI Prophylaxis: Hep SQ and H2B/PPI    Discussed with patient at bedside about hospital admission and my plan care, both understood and agree with my plan care.     Ewell Schilder, MD  7/4/2017 9:00 PM

## 2017-07-05 NOTE — PROGRESS NOTES
conducted an initial consultation and Spiritual Assessment for Lily Maria, who is a 80 y. o.,male. Patients Primary Language is: [place]. According to the patients EMR [employer] Affiliation is: [facility].     The reason the Patient came to the hospital is:   Patient Active Problem List    Diagnosis Date Noted    Dehydration 07/04/2017    Heat exhaustion 07/04/2017    Rib contusion 07/04/2017    Dementia 07/04/2017    RANDALL (acute kidney injury) ([facility] [address] 07/04/2017    Orthostatic dizziness 02/04/2016    Fall at home 02/04/2016    Syncope 02/01/2016    Hypercoagulable state ([facility] [address] 11/17/2015    Advance directive discussed with patient 11/17/2015    Hypertrophy (benign) of prostate     Osteoarthrosis involving multiple sites     Depressive disorder     Chronic kidney disease     Venous (peripheral) insufficiency     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 Explored issues of paula, belief, spirituality and Lutheran/ritual needs while hospitalized. Listened empathically. Provided information about Spiritual Care Services. Offered prayer on patient's behalf. The following outcomes where achieved:  Patient expressed gratitude for 's visit. Assessment:  Patient does not have any Lutheran/cultural needs that will affect patients preferences in health care. There are no spiritual or Lutheran issues which require intervention at this time. Plan:  Chaplains will continue to follow and will provide pastoral care on an as needed/requested basis.  recommends bedside caregivers page  on duty if patient shows signs of acute spiritual or emotional distress.       Blayne Woodruff. [facility] [phone]

## 2017-07-05 NOTE — PROGRESS NOTES
Problem: Mobility Impaired (Adult and Pediatric)  Goal: *Acute Goals and Plan of Care (Insert Text)  STGs to be addressed within 3 days:  1. Bed mobility: Supine to sit to supine min/CGA with HR for meals. 2. Activity tolerance: Tolerate up in chair 1-2 hrs for ADLs. 3. Transfers: Sit to stand to chair min/CGA with RW for ADLs. LTGs to be addressed within 7 days:  1. Standing/Ambulation Balance: Increase to Good with RW for safe transfers and gait. 2. Ambulation: Ambulate > 50 ft min/CGA with RW for home mobility. 3. Patient Education: Independent with HEP for home safety. Outcome: Progressing Towards Goal  PHYSICAL THERAPY EVALUATION     Patient: Eulalio Groves (80 y.o. male)  Date: 7/5/2017  Primary Diagnosis: Heat exhaustion  RANDALL (acute kidney injury) ([facility] [address]  Dehydration  Dementia  Rib contusion, left, subsequent encounter  Precautions:   Fall, Skin      ASSESSMENT :  Based on the objective data described below, the patient presents to PT with decreased functional mobility with regard to bed mobility, transfers, gait and overall tolerance for activity. Patient reports that he required assistance with ADL's PTA, utilized [facility] and RW as needed. Patient with (+) h/o falls, multiple ones in the last few days prior to this admission. Patient reports that he has had a decline in function in the last few months, now utilizes lift chair recliner for getting up. Today, patient required mod/max A for bed mobility, verbal/tactile cues for hand placement. Patient transitioned into standing max A with RW, elevated bed required for assistance. Patient able to complete sidesteps at EoB min A with RW, manual assistance with walker required for safety. Patient with very limited standing/ambulation due to ongoing generalized weakness. Patient was assisted back to semi-reclined position in bed. Patient would benefit from PT to address above impairments and assist with discharge planning.      Patient will benefit from skilled intervention to address the above impairments. Patients rehabilitation potential is considered to be Fair  Factors which may influence rehabilitation potential include:   [ ]         None noted  [X]         Mental ability/status  [X]         Medical condition  [ ]         Home/family situation and support systems  [X]         Safety awareness  [ ]         Pain tolerance/management  [ ]         Other:        PLAN :  Recommendations and Planned Interventions:  [X]           Bed Mobility Training             [X]    Neuromuscular Re-Education  [X]           Transfer Training                   [ ]    Orthotic/Prosthetic Training  [X]           Gait Training                          [ ]    Modalities  [X]           Therapeutic Exercises          [ ]    Edema Management/Control  [X]           Therapeutic Activities            [X]    Patient and Family Training/Education  [ ]           Other (comment):     Frequency/Duration: Patient will be followed by physical therapy daily x 4-7 x week to address goals. Discharge Recommendations: Skilled Nursing Facility  Further Equipment Recommendations for Discharge: rolling walker       SUBJECTIVE:   Patient stated I can't do anything.       OBJECTIVE DATA SUMMARY:       Past Medical History:   Diagnosis Date    Abnormal myocardial perfusion study 04/12/2012     Small, mild mid to distal anterior septal defect concerning for ischemia. Mild mid to distal anterior septal hypk. EF 61%. Neg EKG on pharm stress test.  Low to intermediate risk.  Arthritis      CAD (coronary artery disease) December 2009     presented with anterior wall STEMI with subsequent 100% proximal LAD thrombotic lesion stented to residual 0% using 3.5-mm Cypher stent (13-mm length). He only had mild disease in his left main, RCA, and circumflex coronary artery.     Chronic kidney disease      Depressive disorder      Dyslipidemia      Gout      History of echocardiogram 06/25/2013     EF 55-60%. No RWMA. Normal diastolic function. Sm pericardial effusion (also noted on echo 4/26/10), without tamponade physiology.  Hypercoagulable state ([address]       DVT x 2 (L) 2/2014     Hypertension      Hypertrophy (benign) of prostate      Lower extremity venous duplex 07/05/2013     Right leg:  Acute, occlusive DVT in posterior tibial vein. Pulsatile flow.  Neuropathy      Normal ankle brachial index 01/16/2012     No significant peripheral arterial disease bilaterally. R BHANU 1.31.  L BHANU 1.31.    Pacemaker 04/27/10     Implantation of dual-chamber Medtronic pacemaker    S/P cardiac cath 12/28/2009     oRCA 30%. LM patent. CX patent. LAD p100% thrombotic (Pronto thrombectomy, 3.5 x 13 Cypher stent). LVEDP 26.  EF 60%. Min anteroapical hypk.       Sinus bradycardia       pacemaker in 2010    Thromboembolus [facility]) 2011     h/o DVT x2 to LLE during surgery for Right TKR    Venous (peripheral) insufficiency       Past Surgical History:   Procedure Laterality Date    HX COLONOSCOPY   11/2004     neg    HX HEART CATHETERIZATION   12/28/09     LAD stent for AMI 3.5mm Cypher stent (13mm length)    HX ORTHOPAEDIC         shoulder manipulation    HX ORTHOPAEDIC   6/2013     right knee replacement    HX PACEMAKER   04/27/10     Implantation of dual-chamber Medtronic pacemaker     Barriers to Learning/Limitations: None  Compensate with: N/A  Prior Level of Function/Home Situation:   Home Situation  Home Environment: Private residence  # Steps to Enter: 4  Rails to Enter: Yes  Hand Rails : Bilateral  One/Two Story Residence: One story  Living Alone: No  Support Systems: Spouse/Significant Other/Partner  Patient Expects to be Discharged to[de-identified] Private residence  Current DME Used/Available at Home: [address], straight, Walker, rolling, Grab bars  Tub or Shower Type: Tub/Shower combination  Critical Behavior:  Neurologic State: Alert  Psychosocial  Patient Behaviors: Calm;Cooperative  Purposeful Interaction: Yes  Strength:    Strength: Generally decreased, functional (B UE/LE 2/5)  Tone & Sensation:   Tone: Normal  Sensation: Intact (B LE intact to LT)   Range Of Motion:  AROM: Generally decreased, functional (B UE/LE)  Functional Mobility:  Bed Mobility:  Rolling: Moderate assistance; Additional time  Supine to Sit: Maximum assistance; Additional time  Sit to Supine: Maximum assistance; Additional time  Scooting: Maximum assistance; Additional time  Transfers:  Sit to Stand: Maximum assistance; Additional time (with RW)  Stand to Sit: Maximum assistance; Moderate assistance; Additional time (with RW)  Balance:   Sitting: Impaired; With support (with B UE support)  Sitting - Static: Fair (occasional)  Sitting - Dynamic: Poor (constant support)  Standing: Impaired;Pull to stand; With support (with RW)  Standing - Static: Poor;Constant support (with RW)  Standing - Dynamic : Poor (with RW)  Ambulation/Gait Training:  Distance (ft):  (sidesteps at [employer])  Assistive Device: Walker, rolling  Ambulation - Level of Assistance: Minimal assistance; Additional time  Base of Support: Narrowed  Speed/Luz: Pace decreased (<100 feet/min); Shuffled; Slow  Interventions: Visual/Demos; Verbal cues; Tactile cues; Safety awareness training;Manual cues  Pain:  Pain Scale 1: Numeric (0 - 10)  Pain Intensity 1: 0  Activity Tolerance:   Fair  Please refer to the flowsheet for vital signs taken during this treatment. After treatment:   [ ] Patient left in no apparent distress sitting up in chair  [ ] Patient left sitting on EOB  [X] Patient left in no apparent distress in bed  [ ] Patient declined to be OOB at this time due to   [X] Call bell left within reach  [ ] Nursing notified  [ ] Caregiver present  [ ] Bed alarm activated      COMMUNICATION/EDUCATION:   [X]         Fall prevention education was provided and the patient/caregiver indicated understanding.   [X]         Patient/family have participated as able in goal setting and plan of care. [X]         Patient/family agree to work toward stated goals and plan of care. [ ]         Patient understands intent and goals of therapy, but is neutral about his/her participation. [ ]         Patient is unable to participate in goal setting and plan of care. Thank you for this referral.  Keshav Lai, PT   Time Calculation: 26 mins      G-codes:  Mobility  Current  CM= 80-99%   Goal  CJ= 20-39%. The severity rating is based on the Level of Assistance required for Functional Mobility and ADLs.      Eval Complexity: History: HIGH Complexity :3+ comorbidities / personal factors will impact the outcome/ POC Exam:HIGH Complexity : 4+ Standardized tests and measures addressing body structure, function, activity limitation and / or participation in recreation  Presentation: LOW Complexity : Stable, uncomplicated Overall Complexity:LOW

## 2017-07-05 NOTE — PROGRESS NOTES
Care Management Interventions  PCP Verified by CM: Yes  Mode of Transport at Discharge: BLS  Transition of Care Consult (CM Consult): Discharge Planning  MyChart Signup: No  Discharge Durable Medical Equipment: No  Physical Therapy Consult: Yes  Occupational Therapy Consult: No  Speech Therapy Consult: No  Current Support Network: Lives with Spouse, Own Home  Confirm Follow Up Transport: Family  Plan discussed with Pt/Family/Caregiver: Yes  Discharge Location  Discharge Placement: Home    80 y. o.in observation for heat exhaustion, RANDALL, dehydration, and Dementia. Alert, oriented, verbally responsive discussed observation status. Observation letter and BARBOZA put to paper chart. He lives with spouse in 1 story home. He has a walker, cane, and BSC at home. Gets prescriptions from [facility]. He plans to return home at d/c.

## 2017-07-05 NOTE — ROUTINE PROCESS
0700-Bedside shift change report given to Caryle Becton, RN (oncoming nurse) by Blas Olson (offgoing nurse). Report included the following information SBAR, Kardex and MAR.       0900- Patient resting in bed at this time, Patient alert and oriented x3. Will continue to monitor. 1100- Patient resting, no complaints of pain or distress noted. 1900- Bedside shift change report given to [address] [place] [address] (oncoming nurse) by Caryle Becton, RN (offgoing nurse). Report included the following information SBAR, Kardex and MAR.

## 2017-07-06 PROBLEM — R26.2 AMBULATORY DYSFUNCTION: Status: ACTIVE | Noted: 2017-07-06

## 2017-07-06 LAB
ANION GAP BLD CALC-SCNC: 7 MMOL/L (ref 3–18)
BASOPHILS # BLD AUTO: 0 K/UL (ref 0–0.1)
BASOPHILS # BLD: 0 % (ref 0–2)
BUN SERPL-MCNC: 20 MG/DL (ref 7–18)
BUN/CREAT SERPL: 22 (ref 12–20)
CALCIUM SERPL-MCNC: 7.8 MG/DL (ref 8.5–10.1)
CHLORIDE SERPL-SCNC: 106 MMOL/L (ref 100–108)
CO2 SERPL-SCNC: 29 MMOL/L (ref 21–32)
CREAT SERPL-MCNC: 0.93 MG/DL (ref 0.6–1.3)
DIFFERENTIAL METHOD BLD: ABNORMAL
EOSINOPHIL # BLD: 0.2 K/UL (ref 0–0.4)
EOSINOPHIL NFR BLD: 3 % (ref 0–5)
ERYTHROCYTE [DISTWIDTH] IN BLOOD BY AUTOMATED COUNT: 12.7 % (ref 11.6–14.5)
GLUCOSE SERPL-MCNC: 87 MG/DL (ref 74–99)
HCT VFR BLD AUTO: 36.2 % (ref 36–48)
HGB BLD-MCNC: 12.2 G/DL (ref 13–16)
LYMPHOCYTES # BLD AUTO: 15 % (ref 21–52)
LYMPHOCYTES # BLD: 1 K/UL (ref 0.9–3.6)
MCH RBC QN AUTO: 31 PG (ref 24–34)
MCHC RBC AUTO-ENTMCNC: 33.7 G/DL (ref 31–37)
MCV RBC AUTO: 92.1 FL (ref 74–97)
MONOCYTES # BLD: 0.6 K/UL (ref 0.05–1.2)
MONOCYTES NFR BLD AUTO: 8 % (ref 3–10)
NEUTS SEG # BLD: 5 K/UL (ref 1.8–8)
NEUTS SEG NFR BLD AUTO: 74 % (ref 40–73)
PLATELET # BLD AUTO: 82 K/UL (ref 135–420)
PMV BLD AUTO: 12.2 FL (ref 9.2–11.8)
POTASSIUM SERPL-SCNC: 3.4 MMOL/L (ref 3.5–5.5)
RBC # BLD AUTO: 3.93 M/UL (ref 4.7–5.5)
SODIUM SERPL-SCNC: 142 MMOL/L (ref 136–145)
WBC # BLD AUTO: 6.8 K/UL (ref 4.6–13.2)

## 2017-07-06 PROCEDURE — 74011250637 HC RX REV CODE- 250/637: Performed by: FAMILY MEDICINE

## 2017-07-06 PROCEDURE — 74011250637 HC RX REV CODE- 250/637: Performed by: INTERNAL MEDICINE

## 2017-07-06 PROCEDURE — 77030020186 HC BOOT HL PROTCT SAGE -B

## 2017-07-06 PROCEDURE — 65270000029 HC RM PRIVATE

## 2017-07-06 PROCEDURE — 99218 HC RM OBSERVATION: CPT

## 2017-07-06 PROCEDURE — 36415 COLL VENOUS BLD VENIPUNCTURE: CPT | Performed by: FAMILY MEDICINE

## 2017-07-06 PROCEDURE — 74011250636 HC RX REV CODE- 250/636: Performed by: FAMILY MEDICINE

## 2017-07-06 PROCEDURE — 77030027138 HC INCENT SPIROMETER -A

## 2017-07-06 PROCEDURE — 97110 THERAPEUTIC EXERCISES: CPT

## 2017-07-06 PROCEDURE — 77030011256 HC DRSG MEPILEX <16IN NO BORD MOLN -A

## 2017-07-06 PROCEDURE — 97530 THERAPEUTIC ACTIVITIES: CPT

## 2017-07-06 PROCEDURE — 74011250636 HC RX REV CODE- 250/636: Performed by: INTERNAL MEDICINE

## 2017-07-06 PROCEDURE — 85025 COMPLETE CBC W/AUTO DIFF WBC: CPT | Performed by: FAMILY MEDICINE

## 2017-07-06 PROCEDURE — 80048 BASIC METABOLIC PNL TOTAL CA: CPT | Performed by: FAMILY MEDICINE

## 2017-07-06 RX ORDER — THERA TABS 400 MCG
1 TAB ORAL DAILY
Status: DISCONTINUED | OUTPATIENT
Start: 2017-07-07 | End: 2017-07-10 | Stop reason: HOSPADM

## 2017-07-06 RX ORDER — POTASSIUM CHLORIDE 20 MEQ/1
40 TABLET, EXTENDED RELEASE ORAL
Status: COMPLETED | OUTPATIENT
Start: 2017-07-06 | End: 2017-07-06

## 2017-07-06 RX ADMIN — SODIUM CHLORIDE 75 ML/HR: 900 INJECTION, SOLUTION INTRAVENOUS at 05:26

## 2017-07-06 RX ADMIN — METOPROLOL TARTRATE 25 MG: 25 TABLET ORAL at 09:04

## 2017-07-06 RX ADMIN — CITALOPRAM HYDROBROMIDE 20 MG: 20 TABLET ORAL at 09:04

## 2017-07-06 RX ADMIN — SIMVASTATIN 20 MG: 20 TABLET, FILM COATED ORAL at 21:41

## 2017-07-06 RX ADMIN — LATANOPROST 1 DROP: 50 SOLUTION/ DROPS OPHTHALMIC at 21:54

## 2017-07-06 RX ADMIN — FLUTICASONE PROPIONATE 2 SPRAY: 50 SPRAY, METERED NASAL at 09:09

## 2017-07-06 RX ADMIN — SODIUM CHLORIDE 75 ML/HR: 900 INJECTION, SOLUTION INTRAVENOUS at 17:20

## 2017-07-06 RX ADMIN — METOPROLOL TARTRATE 25 MG: 25 TABLET ORAL at 17:21

## 2017-07-06 RX ADMIN — MECLIZINE 25 MG: 12.5 TABLET ORAL at 09:03

## 2017-07-06 RX ADMIN — POTASSIUM CHLORIDE 40 MEQ: 1500 TABLET, EXTENDED RELEASE ORAL at 17:21

## 2017-07-06 NOTE — PROGRESS NOTES
Problem: Mobility Impaired (Adult and Pediatric)  Goal: *Acute Goals and Plan of Care (Insert Text)  STGs to be addressed within 3 days:  1. Bed mobility: Supine to sit to supine min/CGA with HR for meals. 2. Activity tolerance: Tolerate up in chair 1-2 hrs for ADLs. 3. Transfers: Sit to stand to chair min/CGA with RW for ADLs. LTGs to be addressed within 7 days:  1. Standing/Ambulation Balance: Increase to Good with RW for safe transfers and gait. 2. Ambulation: Ambulate > 50 ft min/CGA with RW for home mobility. 3. Patient Education: Independent with HEP for home safety. PHYSICAL THERAPY TREATMENT     Patient: Kori Ellington. (80 y.o. male)  Date: 7/6/2017  Diagnosis: Heat exhaustion  RANDALL (acute kidney injury) ([facility] [address]  Dehydration  Dementia  Rib contusion, left, subsequent encounter <principal problem not specified>       Precautions: Fall, Skin  Chart, physical therapy assessment, plan of care and goals were reviewed. ASSESSMENT:  Increased transfer proficiency noted as pt is able to perform throughout functional transfers with decreased assist.  Activity tolerance improving as pt is able to tolerate significant bed mobility,transfer, and gait training. Pt is progressing well toward goals. Progression toward goals:  [X]      Improving appropriately and progressing toward goals  [ ]      Improving slowly and progressing toward goals  [ ]      Not making progress toward goals and plan of care will be adjusted       PLAN:  Patient continues to benefit from skilled intervention to address the above impairments. Continue treatment per established plan of care. Discharge Recommendations:  [facility]  Further Equipment Recommendations for Discharge:  N/A       Mobility  Current  CJ= 20-39%. The severity rating is based on the Level of Assistance required for Functional Mobility and ADLs. Mobility   Goal  CI= 1-19%.   The severity rating is based on the Level of Assistance required for Functional Mobility and ADLs. SUBJECTIVE:   Patient stated It's this L knee that is messing me up.       OBJECTIVE DATA SUMMARY:   Critical Behavior:  Neurologic State: Alert, Eyes open spontaneously  Orientation Level: Appropriate for age, Oriented X4  Cognition: Follows commands  Safety/Judgement: Fall prevention, Decreased insight into deficits, Decreased awareness of need for assistance  Functional Mobility Training:  Bed Mobility:  Rolling: Minimum assistance (verbal cues for technique (log roll) to maximzie efficiency)  Supine to Sit: Minimum assistance; Additional time  Transfers:  Sit to Stand: Minimum assistance  Stand to Sit: Minimum assistance  Bed to Chair: Minimum assistance  Other: stand step with RW (verbal cues for sequence)  Balance:  Sitting: Impaired; Without support  Sitting - Static: Good (unsupported)  Sitting - Dynamic: Fair (occasional)  Standing: Impaired; With support  Standing - Static: Fair  Standing - Dynamic : Fair  Ambulation/Gait Training:  Distance (ft): 6 Feet (ft)  Assistive Device: Walker, rolling  Ambulation - Level of Assistance: Contact guard assistance  Gait Abnormalities: Decreased step clearance; Step to gait  Base of Support: Narrowed  Speed/Luz: Delayed; Slow  Interventions: Verbal cues  Therapeutic Exercises:   AP's,heel slides,quad sets,glut sets x's 10 each. Significant cuing for form. Pain:  Pt reports 0/10 pain or discomfort prior to treatment. Pt reports 0/10 pain or discomfort post treatment. Activity Tolerance:   fair  Please refer to the flowsheet for vital signs taken during this treatment.   After treatment:   [X] Patient left in no apparent distress sitting up in chair  [ ] Patient left in no apparent distress in bed  [X] Call bell left within reach  [ ] Nursing notified  [ ] Caregiver present  [ ] Bed alarm activated      Rosa Bernstein PTA   Time Calculation: 30 mins

## 2017-07-06 NOTE — ROUTINE PROCESS
0720 Pt received after bedside shift report from [facility], RN. Pt resting in bed with no distress noted. Able to make needs known. Denies needs at this time. Oxygen on 3lpm via NC. PIV fluid infusing as ordered. Bed locked and in low position. Instruct pt to call for assistance. Call bell in reach. End of shift bedside report given to [facility], RN. Report included the following information. SBAR, MAR, KARDEX  AND RECENT RESULTS.

## 2017-07-06 NOTE — PROGRESS NOTES
NUTRITION    Nutrition Consult: General Nutrition Management & Supplements     RECOMMENDATIONS / PLAN:     - Add supplements: Ensure Enlive TID. - Add daily multivitamin.   - Continue RD inpatient monitoring and evaluation. NUTRITION INTERVENTIONS & DIAGNOSIS:     [x] Meals/Snacks: modified diet  [x] Medical food supplementation: initiate    [x] Vitamin/mineral supplementation: add daily MVI    Nutrition Diagnosis: Unintentional weight loss related to inadequate energy intake as evidenced by 10 lb, 5% weight loss x 4 months. ASSESSMENT:     Pt reports good appetite, improved since admission with variable meal intake.      Average po intake adequate to meet patients estimated nutritional needs:   [] Yes     [x] No   [] Unable to determine at this time    Diet: DIET CARDIAC Regular      Food Allergies: NKFA  Current Appetite:   [x] Good     [] Fair     [] Poor     [] Other:  Appetite/meal intake prior to admission:   [] Good     [] Fair     [x] Poor     [] Other:  Feeding Limitations:  [] Swallowing difficulty    [] Chewing difficulty    [x] Other: tolerating diet, able to feed himself without difficulty per nursing  Current Meal Intake: Patient Vitals for the past 100 hrs:   % Diet Eaten   07/06/17 1012 25 %   07/05/17 1829 90 %   07/05/17 1400 60 %   07/05/17 1115 50 %     BM: 7/2    Skin Integrity: right elbow laceration, left elbow abrasion, head abrasion, buttocks erythema   Edema: none   Pertinent Medications: Reviewed; NS at 75 mL/hr    Recent Labs      07/06/17   0310  07/05/17   0234  07/04/17   1305   NA  142  142  141   K  3.4*  3.3*  3.6   CL  106  107  104   CO2  29  28  28   GLU  87  75  96   BUN  20*  34*  45*   CREA  0.93  1.22  1.86*   CA  7.8*  7.6*  8.3*   ALB   --   2.9*   --    SGOT   --   25   --    ALT   --   19   --        Intake/Output Summary (Last 24 hours) at 07/06/17 1318  Last data filed at 07/06/17 1012   Gross per 24 hour   Intake          4457.08 ml   Output             1600 ml Net          2857.08 ml       Anthropometrics:  Ht Readings from Last 1 Encounters:   07/04/17 5' 8\" (1.727 m)     Last 3 Recorded Weights in this Encounter    07/04/17 2121 07/05/17 0107 07/06/17 0552   Weight: 89.5 kg (197 lb 6.4 oz) 89.5 kg (197 lb 6.4 oz) 91.4 kg (201 lb 9.6 oz)     Body mass index is 30.65 kg/(m^2). Obese, Class I    Weight History: pt reports suspected weight loss PTA, unsure of amount (10 lb, 5% weight loss x 4 months per chart)    Weight Metrics 7/6/2017 7/1/2017 6/6/2017 3/7/2017 7/8/2016 4/18/2016 3/29/2016   Weight 201 lb 9.6 oz 202 lb 194 lb 194 lb 202 lb 203 lb 211 lb   BMI 30.65 kg/m2 30.71 kg/m2 28.65 kg/m2 28.65 kg/m2 29.82 kg/m2 29.96 kg/m2 31.15 kg/m2        Admitting Diagnosis: Heat exhaustion  RANDALL (acute kidney injury) ([facility] [address]  Dehydration  Dementia  Rib contusion, left, subsequent encounter  Pertinent PMHx: CKD, CAD, HTN, dementia     Education Needs:        [x] None identified  [] Identified - Not appropriate at this time  []  Identified and addressed - refer to education log  Learning Limitations:   [] None identified  [x] Identified: dementia     Cultural, Scientology & ethnic food preferences:  [x] None identified    [] Identified and addressed     ESTIMATED NUTRITION NEEDS:     Calories: 0818-0260 kcal (MSJx1.2-1.3) based on  [x] Actual BW 91 kg     [] IBW   Protein: 73-91 gm (0.8-1 gm/kg) based on  [x] Actual BW      [] IBW   Fluid: 1 mL/kcal     MONITORING & EVALUATION:     Nutrition Goal(s):   1. Po intake of meals will meet >75% of patient estimated nutritional needs within the next 7 days.   Outcome:  [] Met/Ongoing    []  Not Met    [x] New/Initial Goal     Monitoring:   [x] Diet tolerance   [x] Meal intake   [x] Supplement intake   [] GI symptoms/ability to tolerate po diet   [] Respiratory status   [] Plan of care      Previous Recommendations (for follow-up assessments only):     []   Implemented       []   Not Implemented (RD to address)     [] No Recommendation Made     Discharge Planning: cardiac diet   [x] Participated in care planning, discharge planning, & interdisciplinary rounds as appropriate      Denton Nava, [address], [address] [place] [address]    Pager: [phone]

## 2017-07-06 NOTE — PROGRESS NOTES
Spoke with daughter who is concerned about patient going home and being safe. Discussed MD decisions to send patient to SNF. FOC given via phone, daughter would like patient to go to [facility] since he has been there before. Will discuss with patient. Spoke to Dr Ev Quick about patient status. Informed hem that patient is unable to transfer to SNF in observation status.

## 2017-07-06 NOTE — ROUTINE PROCESS
1915 Assumed care of patient from off going nurse. Patient resting in bed. No distress noted. Call bell within reach, siderails up x 3, bed in lowest position, and patient instructed to use call bell for assistance. Bed alarm on at this time. Will continue to monitor. 0715 Bedside and Verbal shift change report given to [place] (oncoming nurse) by Alex Barron RN  (offgoing nurse). Report included the following information Kardex, Intake/Output, MAR and Recent Results.

## 2017-07-06 NOTE — PROGRESS NOTES
Physical Exam   Skin:        Abrasion/scab also noted to forehead. Primary Nurse Jennie Louis RN and Dennise Horvath RN performed a dual skin assessment on this patient Impairment noted- see wound doc flow sheet  Wicho score is 12     Will order speciality bed, prevalon boots applied, no dietary consult at this time and patient placed on lift team for repositioning.

## 2017-07-06 NOTE — PROGRESS NOTES
[facility]  Progress Note    Patient: Kori Stage. Age: 80 y.o. : 1932 MR#: [id number] SSN: [id number]  Date/Time: 2017 5:27 PM    Subjective:     No F/C, N/V, CP, SOB. Has no c/o presently. Assessment/Plan:   1. Dehydration, heat exhaustion, RANDALL - resolved with fluids. Continue gentle hydration. 2. Rib contusion - no fx seen on plain films. Pain control ICS. 3. Mechanical fall - precautions. PT/OT/Dispo planning. 4. Dementia - by hx. No acute. 5. Ambulatory dysfxn - cannot ambulate safely. Disposition pending. 6. HypoK+ - replete orally. Additional Notes:      Case discussed with:  [x]Patient  []Family  []Nursing  [x]Case Management  DVT Prophylaxis:  []Lovenox  [x]Hep SQ  []SCDs  []Coumadin   []On Heparin gtt    Objective:   VS:   Visit Vitals    /58 (BP 1 Location: Left arm, BP Patient Position: At rest)    Pulse 78    Temp 98.5 °F (36.9 °C)    Resp 18    Ht 5' 8\" (1.727 m)    Wt 91.4 kg (201 lb 9.6 oz)    SpO2 99%    BMI 30.65 kg/m2      Tmax/24hrs: Temp (24hrs), Av.8 °F (36.6 °C), Min:97.4 °F (36.3 °C), Max:98.5 °F (36.9 °C)    Intake/Output Summary (Last 24 hours) at 17 1727  Last data filed at 17 1425   Gross per 24 hour   Intake          2348.75 ml   Output             1450 ml   Net           898.75 ml       General:  Awake, alert, NAD. Cardiovascular:  RRR. Pulmonary:  CTA B.  GI:  Soft, NT/ND, NABS. Extremities:  No CT or edema.    Additional:      Labs:    Recent Results (from the past 24 hour(s))   CBC WITH AUTOMATED DIFF    Collection Time: 17  3:10 AM   Result Value Ref Range    WBC 6.8 4.6 - 13.2 K/uL    RBC 3.93 (L) 4.70 - 5.50 M/uL    HGB 12.2 (L) 13.0 - 16.0 g/dL    HCT 36.2 36.0 - 48.0 %    MCV 92.1 74.0 - 97.0 FL    MCH 31.0 24.0 - 34.0 PG    MCHC 33.7 31.0 - 37.0 g/dL    RDW 12.7 11.6 - 14.5 %    PLATELET 82 (L) 114 - 420 K/uL    MPV 12.2 (H) 9.2 - 11.8 FL    NEUTROPHILS 74 (H) 40 - 73 %    LYMPHOCYTES 15 (L) 21 - 52 %    MONOCYTES 8 3 - 10 %    EOSINOPHILS 3 0 - 5 %    BASOPHILS 0 0 - 2 %    ABS. NEUTROPHILS 5.0 1.8 - 8.0 K/UL    ABS. LYMPHOCYTES 1.0 0.9 - 3.6 K/UL    ABS. MONOCYTES 0.6 0.05 - 1.2 K/UL    ABS. EOSINOPHILS 0.2 0.0 - 0.4 K/UL    ABS.  BASOPHILS 0.0 0.0 - 0.1 K/UL    DF AUTOMATED     METABOLIC PANEL, BASIC    Collection Time: 07/06/17  3:10 AM   Result Value Ref Range    Sodium 142 136 - 145 mmol/L    Potassium 3.4 (L) 3.5 - 5.5 mmol/L    Chloride 106 100 - 108 mmol/L    CO2 29 21 - 32 mmol/L    Anion gap 7 3.0 - 18 mmol/L    Glucose 87 74 - 99 mg/dL    BUN 20 (H) 7.0 - 18 MG/DL    Creatinine 0.93 0.6 - 1.3 MG/DL    BUN/Creatinine ratio 22 (H) 12 - 20      GFR est AA >60 >60 ml/min/1.73m2    GFR est non-AA >60 >60 ml/min/1.73m2    Calcium 7.8 (L) 8.5 - 10.1 MG/DL       Signed By: Vandana Whitley MD     July 6, 2017 5:27 PM

## 2017-07-07 LAB
ANION GAP BLD CALC-SCNC: 7 MMOL/L (ref 3–18)
BASOPHILS # BLD AUTO: 0 K/UL (ref 0–0.1)
BASOPHILS # BLD: 0 % (ref 0–2)
BUN SERPL-MCNC: 15 MG/DL (ref 7–18)
BUN/CREAT SERPL: 17 (ref 12–20)
CALCIUM SERPL-MCNC: 8.1 MG/DL (ref 8.5–10.1)
CHLORIDE SERPL-SCNC: 107 MMOL/L (ref 100–108)
CO2 SERPL-SCNC: 27 MMOL/L (ref 21–32)
CREAT SERPL-MCNC: 0.88 MG/DL (ref 0.6–1.3)
DIFFERENTIAL METHOD BLD: ABNORMAL
EOSINOPHIL # BLD: 0.2 K/UL (ref 0–0.4)
EOSINOPHIL NFR BLD: 3 % (ref 0–5)
ERYTHROCYTE [DISTWIDTH] IN BLOOD BY AUTOMATED COUNT: 12.6 % (ref 11.6–14.5)
GLUCOSE SERPL-MCNC: 86 MG/DL (ref 74–99)
HCT VFR BLD AUTO: 36.7 % (ref 36–48)
HGB BLD-MCNC: 12.3 G/DL (ref 13–16)
LYMPHOCYTES # BLD AUTO: 16 % (ref 21–52)
LYMPHOCYTES # BLD: 1.1 K/UL (ref 0.9–3.6)
MCH RBC QN AUTO: 30.5 PG (ref 24–34)
MCHC RBC AUTO-ENTMCNC: 33.5 G/DL (ref 31–37)
MCV RBC AUTO: 91.1 FL (ref 74–97)
MONOCYTES # BLD: 0.6 K/UL (ref 0.05–1.2)
MONOCYTES NFR BLD AUTO: 9 % (ref 3–10)
NEUTS SEG # BLD: 5 K/UL (ref 1.8–8)
NEUTS SEG NFR BLD AUTO: 72 % (ref 40–73)
PLATELET # BLD AUTO: 84 K/UL (ref 135–420)
PMV BLD AUTO: 12 FL (ref 9.2–11.8)
POTASSIUM SERPL-SCNC: 3.9 MMOL/L (ref 3.5–5.5)
RBC # BLD AUTO: 4.03 M/UL (ref 4.7–5.5)
SODIUM SERPL-SCNC: 141 MMOL/L (ref 136–145)
WBC # BLD AUTO: 7 K/UL (ref 4.6–13.2)

## 2017-07-07 PROCEDURE — 74011250637 HC RX REV CODE- 250/637: Performed by: INTERNAL MEDICINE

## 2017-07-07 PROCEDURE — 65270000029 HC RM PRIVATE

## 2017-07-07 PROCEDURE — 80048 BASIC METABOLIC PNL TOTAL CA: CPT | Performed by: FAMILY MEDICINE

## 2017-07-07 PROCEDURE — 74011250637 HC RX REV CODE- 250/637: Performed by: FAMILY MEDICINE

## 2017-07-07 PROCEDURE — 77030011256 HC DRSG MEPILEX <16IN NO BORD MOLN -A

## 2017-07-07 PROCEDURE — 77030033263 HC DRSG MEPILEX 16-48IN BORD MOLN -B

## 2017-07-07 PROCEDURE — 85025 COMPLETE CBC W/AUTO DIFF WBC: CPT | Performed by: FAMILY MEDICINE

## 2017-07-07 PROCEDURE — 97110 THERAPEUTIC EXERCISES: CPT

## 2017-07-07 PROCEDURE — 36415 COLL VENOUS BLD VENIPUNCTURE: CPT | Performed by: FAMILY MEDICINE

## 2017-07-07 PROCEDURE — 77030010545

## 2017-07-07 PROCEDURE — 74011250636 HC RX REV CODE- 250/636: Performed by: INTERNAL MEDICINE

## 2017-07-07 RX ORDER — HYDROCHLOROTHIAZIDE 25 MG/1
12.5 TABLET ORAL DAILY
Status: DISCONTINUED | OUTPATIENT
Start: 2017-07-07 | End: 2017-07-09

## 2017-07-07 RX ORDER — LOSARTAN POTASSIUM 50 MG/1
50 TABLET ORAL DAILY
Status: DISCONTINUED | OUTPATIENT
Start: 2017-07-07 | End: 2017-07-10 | Stop reason: HOSPADM

## 2017-07-07 RX ORDER — POLYETHYLENE GLYCOL 3350 17 G/17G
17 POWDER, FOR SOLUTION ORAL DAILY
Status: DISCONTINUED | OUTPATIENT
Start: 2017-07-08 | End: 2017-07-10 | Stop reason: HOSPADM

## 2017-07-07 RX ORDER — AMLODIPINE BESYLATE 5 MG/1
5 TABLET ORAL
Status: COMPLETED | OUTPATIENT
Start: 2017-07-08 | End: 2017-07-08

## 2017-07-07 RX ORDER — FACIAL-BODY WIPES
10 EACH TOPICAL DAILY PRN
Status: DISCONTINUED | OUTPATIENT
Start: 2017-07-07 | End: 2017-07-07

## 2017-07-07 RX ORDER — FACIAL-BODY WIPES
10 EACH TOPICAL
Status: COMPLETED | OUTPATIENT
Start: 2017-07-07 | End: 2017-07-07

## 2017-07-07 RX ADMIN — THERA TABS 1 TABLET: TAB at 10:18

## 2017-07-07 RX ADMIN — FLUTICASONE PROPIONATE 2 SPRAY: 50 SPRAY, METERED NASAL at 10:22

## 2017-07-07 RX ADMIN — HYDROCHLOROTHIAZIDE 12.5 MG: 25 TABLET ORAL at 15:14

## 2017-07-07 RX ADMIN — METOPROLOL TARTRATE 25 MG: 25 TABLET ORAL at 17:09

## 2017-07-07 RX ADMIN — METOPROLOL TARTRATE 25 MG: 25 TABLET ORAL at 09:00

## 2017-07-07 RX ADMIN — HEPARIN SODIUM 5000 UNITS: 5000 INJECTION, SOLUTION INTRAVENOUS; SUBCUTANEOUS at 21:22

## 2017-07-07 RX ADMIN — CITALOPRAM HYDROBROMIDE 20 MG: 20 TABLET ORAL at 10:18

## 2017-07-07 RX ADMIN — BISACODYL 10 MG: 10 SUPPOSITORY RECTAL at 17:09

## 2017-07-07 RX ADMIN — LATANOPROST 1 DROP: 50 SOLUTION/ DROPS OPHTHALMIC at 21:27

## 2017-07-07 RX ADMIN — SIMVASTATIN 20 MG: 20 TABLET, FILM COATED ORAL at 21:22

## 2017-07-07 RX ADMIN — HEPARIN SODIUM 5000 UNITS: 5000 INJECTION, SOLUTION INTRAVENOUS; SUBCUTANEOUS at 15:15

## 2017-07-07 RX ADMIN — LOSARTAN POTASSIUM 50 MG: 50 TABLET, FILM COATED ORAL at 15:14

## 2017-07-07 NOTE — ROUTINE PROCESS
[facility] care of patient from off going nurse. Patient resting in bed. No distress noted. Call bell within reach, siderails up x 3, bed in lowest position, and patient instructed to use call bell for assistance. Bed alarm on at this time. Will continue to monitor. [phone] Patient confused. Oriented to person, and time. Disoriented to place and situation. Patient pulled IV. Patient reoriented to room, use of call bell, surroundings, and situation. Will continue to monitor. 2233 Entered patient's room. R groin was bleeding. Patient stated, \"I scratched it. \" Instructed patient not to scratch fragile skin due to the risk of skin breakage and infection. Area to R groin cleansed with NS and silicone. Skin tear/laceration noted to R groin. Mepliex dressing applied. 9180 Bedside and Verbal shift change report given to [place] (oncoming nurse) by Jerry Watson RN(offgoing nurse). Report included the following information Kardex, Intake/Output, MAR and Recent Results.

## 2017-07-07 NOTE — PROGRESS NOTES
Problem: Mobility Impaired (Adult and Pediatric)  Goal: *Acute Goals and Plan of Care (Insert Text)  STGs to be addressed within 3 days:  1. Bed mobility: Supine to sit to supine min/CGA with HR for meals. 2. Activity tolerance: Tolerate up in chair 1-2 hrs for ADLs. 3. Transfers: Sit to stand to chair min/CGA with RW for ADLs. LTGs to be addressed within 7 days:  1. Standing/Ambulation Balance: Increase to Good with RW for safe transfers and gait. 2. Ambulation: Ambulate > 50 ft min/CGA with RW for home mobility. 3. Patient Education: Independent with HEP for home safety. PHYSICAL THERAPY TREATMENT     Patient: Juan Caraballo (80 y.o. male)  Date: 7/7/2017  Diagnosis: Heat exhaustion  RANDALL (acute kidney injury) ([facility] [address]  Dehydration  Dementia  Rib contusion, left, subsequent encounter  Ambulatory dysfunction <principal problem not specified>       Precautions: Fall, Skin  Chart, physical therapy assessment, plan of care and goals were reviewed. ASSESSMENT:  Pt presents with decreased tolerance to activity as indicated by inability to motivate to EOB or functional mobility training oob. Pt is agreeable to therapeutic exercise and tolerates well with supervision and cues for consistency with form. Progression toward goals:  [ ]      Improving appropriately and progressing toward goals  [X]      Improving slowly and progressing toward goals  [X]      Not making progress toward goals and plan of care will be adjusted       PLAN:  Patient continues to benefit from skilled intervention to address the above impairments. Continue treatment per established plan of care. Discharge Recommendations:  Skilled Nursing Facility  Further Equipment Recommendations for Discharge:  N/A       SUBJECTIVE:   Patient stated I just feel bad.   Pt cannot elaborate further.       OBJECTIVE DATA SUMMARY:   Critical Behavior:  Neurologic State: Alert, Appropriate for age  Orientation Level: Oriented X4  Cognition: Follows commands  Safety/Judgement: Fall prevention, Decreased insight into deficits, Decreased awareness of need for assistance  Functional Mobility Training:  Bed Mobility:  Rolling: Maximum assistance  Scooting: Maximum assistance  Therapeutic Exercises:   Supine: heel slides,AP's,hip abd/add,quad sets,glut sets  Sets: 2  Reps: 10  Assist level: supervision with min-moderate verbal cues for form. Pain:  Pt reports 0/10 pain or discomfort prior to treatment. Pt reports 0/10 pain or discomfort post treatment. Activity Tolerance:   Poor: pt is disengaged and lethargic today. Please refer to the flowsheet for vital signs taken during this treatment.   After treatment:   [ ] Patient left in no apparent distress sitting up in chair  [X] Patient left in no apparent distress in bed  [X] Call bell left within reach  [ ] Nursing notified  [ ] Caregiver present  [ ] Bed alarm activated      Loida Huitron PTA   Time Calculation: 23 mins

## 2017-07-07 NOTE — ROUTINE PROCESS
0710 Pt received after bedside shift report from [facility], RN. Pt resting in bed with no distress noted. Able to make needs known. Denies needs at this time. Oxygen on 3lpm via NC. PIV fluid infusing as ordered. Bed locked and in low position. Call bell in reach.     End of shift bedside report given to Gillian Bird RN. Report included the following information. SBAR, MAR, KARDEX AND RECENT RESULTS.

## 2017-07-07 NOTE — PROGRESS NOTES
[facility]  Progress Note    Patient: Malinda Woodard. Age: 80 y.o. : 1932 MR#: [id number] SSN: [id number]  Date/Time: 2017 5:27 PM    Subjective:     Feels tired today. No F/C, N/V, CP, SOB. Case d/w case mgmt: dispo pending 3 overnights, aim for xfer Monday. Assessment/Plan:   1. Dehydration, heat exhaustion, RANDALL - resolved with fluids. Continue gentle hydration. No new issues. 2. Rib contusion - no fx seen on plain films. Pain controlled. ICS. 3. Mechanical fall - precautions. PT/OT/Dispo planning. 4. Dementia - by hx. No acute. 5. Ambulatory dysfxn - cannot ambulate safely. Disposition pending. 6. HypoK+ - replete orally. 7. HTN - BPs elevated. Restart home ARB/HCTZ. Additional Notes:      Case discussed with:  [x]Patient  []Family  []Nursing  [x]Case Management  DVT Prophylaxis:  []Lovenox  [x]Hep SQ  []SCDs  []Coumadin   []On Heparin gtt    Objective:   VS:   Visit Vitals    BP (!) 190/92 (BP 1 Location: Left arm, BP Patient Position: At rest)    Pulse 60    Temp 98.9 °F (37.2 °C)    Resp 18    Ht 5' 8\" (1.727 m)    Wt 92.5 kg (204 lb)    SpO2 96%    BMI 31.02 kg/m2      Tmax/24hrs: Temp (24hrs), Av.4 °F (36.9 °C), Min:97.3 °F (36.3 °C), Max:98.9 °F (37.2 °C)      Intake/Output Summary (Last 24 hours) at 17 1305  Last data filed at 17 0659   Gross per 24 hour   Intake             2520 ml   Output             1050 ml   Net             1470 ml       General:  Awake, alert, NAD. Cardiovascular:  RRR. Pulmonary:  CTA B.  GI:  Soft, NT/ND, NABS. Extremities:  No CT or edema.    Additional:      Labs:    Recent Results (from the past 24 hour(s))   METABOLIC PANEL, BASIC    Collection Time: 17  4:13 AM   Result Value Ref Range    Sodium 141 136 - 145 mmol/L    Potassium 3.9 3.5 - 5.5 mmol/L    Chloride 107 100 - 108 mmol/L    CO2 27 21 - 32 mmol/L    Anion gap 7 3.0 - 18 mmol/L    Glucose 86 74 - 99 mg/dL    BUN 15 7.0 - 18 MG/DL    Creatinine 0.88 0.6 - 1.3 MG/DL    BUN/Creatinine ratio 17 12 - 20      GFR est AA >60 >60 ml/min/1.73m2    GFR est non-AA >60 >60 ml/min/1.73m2    Calcium 8.1 (L) 8.5 - 10.1 MG/DL   CBC WITH AUTOMATED DIFF    Collection Time: 07/07/17  4:13 AM   Result Value Ref Range    WBC 7.0 4.6 - 13.2 K/uL    RBC 4.03 (L) 4.70 - 5.50 M/uL    HGB 12.3 (L) 13.0 - 16.0 g/dL    HCT 36.7 36.0 - 48.0 %    MCV 91.1 74.0 - 97.0 FL    MCH 30.5 24.0 - 34.0 PG    MCHC 33.5 31.0 - 37.0 g/dL    RDW 12.6 11.6 - 14.5 %    PLATELET 84 (L) 054 - 420 K/uL    MPV 12.0 (H) 9.2 - 11.8 FL    NEUTROPHILS 72 40 - 73 %    LYMPHOCYTES 16 (L) 21 - 52 %    MONOCYTES 9 3 - 10 %    EOSINOPHILS 3 0 - 5 %    BASOPHILS 0 0 - 2 %    ABS. NEUTROPHILS 5.0 1.8 - 8.0 K/UL    ABS. LYMPHOCYTES 1.1 0.9 - 3.6 K/UL    ABS. MONOCYTES 0.6 0.05 - 1.2 K/UL    ABS. EOSINOPHILS 0.2 0.0 - 0.4 K/UL    ABS.  BASOPHILS 0.0 0.0 - 0.1 K/UL    DF AUTOMATED         Signed By: Roberta Hammond MD     July 7, 2017 5:27 PM

## 2017-07-08 LAB
ANION GAP BLD CALC-SCNC: 7 MMOL/L (ref 3–18)
BASOPHILS # BLD AUTO: 0 K/UL (ref 0–0.1)
BASOPHILS # BLD: 0 % (ref 0–2)
BUN SERPL-MCNC: 14 MG/DL (ref 7–18)
BUN/CREAT SERPL: 13 (ref 12–20)
CALCIUM SERPL-MCNC: 8 MG/DL (ref 8.5–10.1)
CHLORIDE SERPL-SCNC: 105 MMOL/L (ref 100–108)
CO2 SERPL-SCNC: 28 MMOL/L (ref 21–32)
CREAT SERPL-MCNC: 1.05 MG/DL (ref 0.6–1.3)
DIFFERENTIAL METHOD BLD: ABNORMAL
EOSINOPHIL # BLD: 0.2 K/UL (ref 0–0.4)
EOSINOPHIL NFR BLD: 3 % (ref 0–5)
ERYTHROCYTE [DISTWIDTH] IN BLOOD BY AUTOMATED COUNT: 12.7 % (ref 11.6–14.5)
GLUCOSE SERPL-MCNC: 85 MG/DL (ref 74–99)
HCT VFR BLD AUTO: 38 % (ref 36–48)
HGB BLD-MCNC: 12.7 G/DL (ref 13–16)
LYMPHOCYTES # BLD AUTO: 14 % (ref 21–52)
LYMPHOCYTES # BLD: 1 K/UL (ref 0.9–3.6)
MCH RBC QN AUTO: 30.6 PG (ref 24–34)
MCHC RBC AUTO-ENTMCNC: 33.4 G/DL (ref 31–37)
MCV RBC AUTO: 91.6 FL (ref 74–97)
MONOCYTES # BLD: 0.6 K/UL (ref 0.05–1.2)
MONOCYTES NFR BLD AUTO: 9 % (ref 3–10)
NEUTS SEG # BLD: 5.3 K/UL (ref 1.8–8)
NEUTS SEG NFR BLD AUTO: 74 % (ref 40–73)
PLATELET # BLD AUTO: 88 K/UL (ref 135–420)
PMV BLD AUTO: 12.4 FL (ref 9.2–11.8)
POTASSIUM SERPL-SCNC: 3.6 MMOL/L (ref 3.5–5.5)
RBC # BLD AUTO: 4.15 M/UL (ref 4.7–5.5)
SODIUM SERPL-SCNC: 140 MMOL/L (ref 136–145)
WBC # BLD AUTO: 7.1 K/UL (ref 4.6–13.2)

## 2017-07-08 PROCEDURE — 36415 COLL VENOUS BLD VENIPUNCTURE: CPT | Performed by: FAMILY MEDICINE

## 2017-07-08 PROCEDURE — 77010033678 HC OXYGEN DAILY

## 2017-07-08 PROCEDURE — 85025 COMPLETE CBC W/AUTO DIFF WBC: CPT | Performed by: FAMILY MEDICINE

## 2017-07-08 PROCEDURE — 77030005538 HC CATH URETH FOL44 BARD -B

## 2017-07-08 PROCEDURE — 97116 GAIT TRAINING THERAPY: CPT

## 2017-07-08 PROCEDURE — 65270000029 HC RM PRIVATE

## 2017-07-08 PROCEDURE — 74011250637 HC RX REV CODE- 250/637: Performed by: INTERNAL MEDICINE

## 2017-07-08 PROCEDURE — 74011250636 HC RX REV CODE- 250/636: Performed by: INTERNAL MEDICINE

## 2017-07-08 PROCEDURE — 97110 THERAPEUTIC EXERCISES: CPT

## 2017-07-08 PROCEDURE — 74011250637 HC RX REV CODE- 250/637: Performed by: FAMILY MEDICINE

## 2017-07-08 PROCEDURE — 80048 BASIC METABOLIC PNL TOTAL CA: CPT | Performed by: FAMILY MEDICINE

## 2017-07-08 RX ADMIN — LOSARTAN POTASSIUM 50 MG: 50 TABLET, FILM COATED ORAL at 08:43

## 2017-07-08 RX ADMIN — AMLODIPINE BESYLATE 5 MG: 5 TABLET ORAL at 00:29

## 2017-07-08 RX ADMIN — POLYETHYLENE GLYCOL 3350 17 G: 17 POWDER, FOR SOLUTION ORAL at 08:45

## 2017-07-08 RX ADMIN — HYDROCHLOROTHIAZIDE 12.5 MG: 25 TABLET ORAL at 08:43

## 2017-07-08 RX ADMIN — LATANOPROST 1 DROP: 50 SOLUTION/ DROPS OPHTHALMIC at 21:07

## 2017-07-08 RX ADMIN — CITALOPRAM HYDROBROMIDE 20 MG: 20 TABLET ORAL at 08:43

## 2017-07-08 RX ADMIN — HEPARIN SODIUM 5000 UNITS: 5000 INJECTION, SOLUTION INTRAVENOUS; SUBCUTANEOUS at 21:06

## 2017-07-08 RX ADMIN — HEPARIN SODIUM 5000 UNITS: 5000 INJECTION, SOLUTION INTRAVENOUS; SUBCUTANEOUS at 15:56

## 2017-07-08 RX ADMIN — SIMVASTATIN 20 MG: 20 TABLET, FILM COATED ORAL at 21:06

## 2017-07-08 RX ADMIN — THERA TABS 1 TABLET: TAB at 08:43

## 2017-07-08 NOTE — PROGRESS NOTES
Pt was received alert and oriented to self with no complaints of pain or discomfort. Pt vitals are within normal limits and on 2l of oxygen per nasal cannula. Pt was repositioned and bed placed in lowest position pt is atrial paced and will continue to be monitored and plan of care will be followed.

## 2017-07-08 NOTE — PROGRESS NOTES
[facility]  Progress Note    Patient: Ariadna Peck. Age: 80 y.o. : 1932 MR#: [id number] SSN: [id number]  Date/Time: 2017 5:27 PM    Subjective:     Denies F/C, N/V, CP, SOB. Has no c/o. Assessment/Plan:   1. Dehydration, heat exhaustion, RANDALL - resolved with fluids. Continue gentle hydration. No new issues. 2. Rib contusion - no fx seen on plain films. Pain controlled. ICS. 3. Mechanical fall - precautions. PT/OT/Dispo planning. 4. Dementia - by hx. No acute. 5. Ambulatory dysfxn - cannot ambulate safely. Disposition pending. Aim for . 6. HypoK+ - replete orally. 7. HTN - BPs elevated. Restart home ARB/HCTZ. 8. Chronic thrombocytopenia - stable. Following. 9. Hx multiple DVT - noted. Last BLE U/S 2016 shows no DVT. Has hx of RLE and LLE DVT in the past.     Additional Notes:      Case discussed with:  [x]Patient  []Family  [x]Nursing  []Case Management  DVT Prophylaxis:  []Lovenox  [x]Hep SQ  []SCDs  []Coumadin   []On Heparin gtt    Objective:   VS:   Visit Vitals    /83 (BP 1 Location: Left arm, BP Patient Position: At rest)    Pulse 62    Temp 98.3 °F (36.8 °C)    Resp 20    Ht 5' 8\" (1.727 m)    Wt 92.5 kg (204 lb)    SpO2 99%    BMI 31.02 kg/m2      Tmax/24hrs: Temp (24hrs), Av.2 °F (36.8 °C), Min:97.4 °F (36.3 °C), Max:98.8 °F (37.1 °C)      Intake/Output Summary (Last 24 hours) at 17 1555  Last data filed at 17 1548   Gross per 24 hour   Intake             1080 ml   Output              350 ml   Net              730 ml       General:  Awake, alert, NAD, in chair. Cardiovascular:  RRR. Pulmonary:  CTA B.  GI:  Soft, NT/ND, NABS. Extremities:  No CT or edema.    Additional:      Labs:    Recent Results (from the past 24 hour(s))   CBC WITH AUTOMATED DIFF    Collection Time: 17  2:17 AM   Result Value Ref Range    WBC 7.1 4.6 - 13.2 K/uL    RBC 4.15 (L) 4.70 - 5.50 M/uL    HGB 12.7 (L) 13.0 - 16.0 g/dL    HCT 38.0 36.0 - 48.0 %    MCV 91.6 74.0 - 97.0 FL    MCH 30.6 24.0 - 34.0 PG    MCHC 33.4 31.0 - 37.0 g/dL    RDW 12.7 11.6 - 14.5 %    PLATELET 88 (L) 843 - 420 K/uL    MPV 12.4 (H) 9.2 - 11.8 FL    NEUTROPHILS 74 (H) 40 - 73 %    LYMPHOCYTES 14 (L) 21 - 52 %    MONOCYTES 9 3 - 10 %    EOSINOPHILS 3 0 - 5 %    BASOPHILS 0 0 - 2 %    ABS. NEUTROPHILS 5.3 1.8 - 8.0 K/UL    ABS. LYMPHOCYTES 1.0 0.9 - 3.6 K/UL    ABS. MONOCYTES 0.6 0.05 - 1.2 K/UL    ABS. EOSINOPHILS 0.2 0.0 - 0.4 K/UL    ABS.  BASOPHILS 0.0 0.0 - 0.1 K/UL    DF AUTOMATED     METABOLIC PANEL, BASIC    Collection Time: 07/08/17  2:17 AM   Result Value Ref Range    Sodium 140 136 - 145 mmol/L    Potassium 3.6 3.5 - 5.5 mmol/L    Chloride 105 100 - 108 mmol/L    CO2 28 21 - 32 mmol/L    Anion gap 7 3.0 - 18 mmol/L    Glucose 85 74 - 99 mg/dL    BUN 14 7.0 - 18 MG/DL    Creatinine 1.05 0.6 - 1.3 MG/DL    BUN/Creatinine ratio 13 12 - 20      GFR est AA >60 >60 ml/min/1.73m2    GFR est non-AA >60 >60 ml/min/1.73m2    Calcium 8.0 (L) 8.5 - 10.1 MG/DL       Signed By: Hayde Fong MD     July 8, 2017 5:27 PM

## 2017-07-08 NOTE — ROUTINE PROCESS
[address] care of patient from off going nurse. Patient resting in bed. No distress noted. Call bell within reach, siderails up x 3, bed in lowest position, and patient instructed to use call bell for assistance. Bed alarm activated at this time. Will continue to monitor. 0 Spoke with Dr. Rona May regarding patient's elevated blood pressures with last reading of 198/98. Received order one time dose of amlodipine 5mg.    0029 Norvasc 5 mg given as ordered. 0209 /76.    0712 Bedside and Verbal shift change report given to Elijah Pimentel RN (oncoming nurse) by Yvonne Morley RN(offgoing nurse). Report included the following information Kardex, Intake/Output, MAR and Recent Results.

## 2017-07-08 NOTE — PROGRESS NOTES
Problem: Mobility Impaired (Adult and Pediatric)  Goal: *Acute Goals and Plan of Care (Insert Text)  STGs to be addressed within 3 days:  1. Bed mobility: Supine to sit to supine min/CGA with HR for meals. 2. Activity tolerance: Tolerate up in chair 1-2 hrs for ADLs. 3. Transfers: Sit to stand to chair min/CGA with RW for ADLs. LTGs to be addressed within 7 days:  1. Standing/Ambulation Balance: Increase to Good with RW for safe transfers and gait. 2. Ambulation: Ambulate > 50 ft min/CGA with RW for home mobility. 3. Patient Education: Independent with HEP for home safety. Outcome: Progressing Towards Goal  PHYSICAL THERAPY TREATMENT     Patient: Tabatha Carmona. (80 y.o. male)  Date: 7/8/2017  Diagnosis: Heat exhaustion  RANDALL (acute kidney injury) ([facility] [address]  Dehydration  Dementia  Rib contusion, left, subsequent encounter  Ambulatory dysfunction <principal problem not specified>  Precautions: Fall, Skin   Chart, physical therapy assessment, plan of care and goals were reviewed. ASSESSMENT:  Pt found supine in bed willing to work with PT. Pt agreeable to sit up in b/s chair. Pt requires max A to sit at EOB, likely due to body habitus and abdominal weakness. Pt is able to ambulate to chair with CG but requires VCs to stay on task. Pt performed therex in sitting with some manual cues to understand what is being asked of him. Pt left with needs in reach. Pt is oriented to call bell and demonstrates calling nurse when asked. Pt I currently only oriented to self. Education: therex, gait. Progression toward goals:  [ ]      Improving appropriately and progressing toward goals  [X]      Improving slowly and progressing toward goals  [ ]      Not making progress toward goals and plan of care will be adjusted       PLAN:  Patient continues to benefit from skilled intervention to address the above impairments. Continue treatment per established plan of care.   Discharge Recommendations:  Home Health  Further Equipment Recommendations for Discharge:  rolling walker       SUBJECTIVE:   Patient stated I'd like to sit up.       OBJECTIVE DATA SUMMARY:   Critical Behavior:  Neurologic State: Alert, Eyes open to stimulus  Orientation Level: Oriented to person, Oriented to place  Cognition: Follows commands  Safety/Judgement: Fall prevention, Decreased insight into deficits, Decreased awareness of need for assistance  Functional Mobility Training:  Bed Mobility:  Rolling: Maximum assistance  Supine to Sit: Maximum assistance  Transfers:  Sit to Stand: Minimum assistance  Stand to Sit: Minimum assistance  Balance:  Sitting: Impaired  Sitting - Static: Good (unsupported)  Sitting - Dynamic: Fair (occasional)  Standing: Impaired; With support  Standing - Static: Fair  Standing - Dynamic : Fair  Ambulation/Gait Training:  Distance (ft): 5 Feet (ft)  Assistive Device: Walker, rolling  Ambulation - Level of Assistance: Contact guard assistance  Gait Abnormalities: Decreased step clearance  Base of Support: Narrowed  Speed/Luz: Slow  Interventions: Verbal cues  Therapeutic Exercises:   LAQ, seated marches, ankle pumps. Pain:  Pre tx pain: 0  Post tx pain: 0  Pain Scale 1: Numeric (0 - 10)  Pain Intensity 1: 0  Activity Tolerance:   Fair   Please refer to the flowsheet for vital signs taken during this treatment. After treatment:   [ ] Patient left in no apparent distress sitting up in chair  [X] Patient left in no apparent distress in bed  [X] Call bell left within reach  [ ] Nursing notified  [ ] Caregiver present  [ ] Bed alarm activated      Case Lynn PTA   Time Calculation: 24 mins     Mobility  Current  CL= 60-79%. The severity rating is based on the Level of Assistance required for Functional Mobility and ADLs. Mobility   Goal  CJ= 20-39%. The severity rating is based on the Level of Assistance required for Functional Mobility and ADLs.

## 2017-07-09 LAB
ANION GAP BLD CALC-SCNC: 6 MMOL/L (ref 3–18)
BASOPHILS # BLD AUTO: 0 K/UL (ref 0–0.1)
BASOPHILS # BLD: 0 % (ref 0–2)
BUN SERPL-MCNC: 18 MG/DL (ref 7–18)
BUN/CREAT SERPL: 18 (ref 12–20)
CALCIUM SERPL-MCNC: 8.7 MG/DL (ref 8.5–10.1)
CHLORIDE SERPL-SCNC: 102 MMOL/L (ref 100–108)
CO2 SERPL-SCNC: 31 MMOL/L (ref 21–32)
CREAT SERPL-MCNC: 1 MG/DL (ref 0.6–1.3)
DIFFERENTIAL METHOD BLD: ABNORMAL
EOSINOPHIL # BLD: 0.2 K/UL (ref 0–0.4)
EOSINOPHIL NFR BLD: 3 % (ref 0–5)
ERYTHROCYTE [DISTWIDTH] IN BLOOD BY AUTOMATED COUNT: 12.6 % (ref 11.6–14.5)
GLUCOSE SERPL-MCNC: 101 MG/DL (ref 74–99)
HCT VFR BLD AUTO: 37.7 % (ref 36–48)
HGB BLD-MCNC: 12.7 G/DL (ref 13–16)
LYMPHOCYTES # BLD AUTO: 17 % (ref 21–52)
LYMPHOCYTES # BLD: 1.2 K/UL (ref 0.9–3.6)
MCH RBC QN AUTO: 30.5 PG (ref 24–34)
MCHC RBC AUTO-ENTMCNC: 33.7 G/DL (ref 31–37)
MCV RBC AUTO: 90.6 FL (ref 74–97)
MONOCYTES # BLD: 0.6 K/UL (ref 0.05–1.2)
MONOCYTES NFR BLD AUTO: 8 % (ref 3–10)
NEUTS SEG # BLD: 5.2 K/UL (ref 1.8–8)
NEUTS SEG NFR BLD AUTO: 72 % (ref 40–73)
PLATELET # BLD AUTO: 100 K/UL (ref 135–420)
PMV BLD AUTO: 12.8 FL (ref 9.2–11.8)
POTASSIUM SERPL-SCNC: 3.6 MMOL/L (ref 3.5–5.5)
RBC # BLD AUTO: 4.16 M/UL (ref 4.7–5.5)
SODIUM SERPL-SCNC: 139 MMOL/L (ref 136–145)
WBC # BLD AUTO: 7.2 K/UL (ref 4.6–13.2)

## 2017-07-09 PROCEDURE — 65270000029 HC RM PRIVATE

## 2017-07-09 PROCEDURE — 74011250637 HC RX REV CODE- 250/637: Performed by: INTERNAL MEDICINE

## 2017-07-09 PROCEDURE — 77010033678 HC OXYGEN DAILY

## 2017-07-09 PROCEDURE — 74011250636 HC RX REV CODE- 250/636: Performed by: INTERNAL MEDICINE

## 2017-07-09 PROCEDURE — 85025 COMPLETE CBC W/AUTO DIFF WBC: CPT | Performed by: FAMILY MEDICINE

## 2017-07-09 PROCEDURE — 74011250637 HC RX REV CODE- 250/637: Performed by: FAMILY MEDICINE

## 2017-07-09 PROCEDURE — 36415 COLL VENOUS BLD VENIPUNCTURE: CPT | Performed by: FAMILY MEDICINE

## 2017-07-09 PROCEDURE — 80048 BASIC METABOLIC PNL TOTAL CA: CPT | Performed by: FAMILY MEDICINE

## 2017-07-09 RX ORDER — HYDROCHLOROTHIAZIDE 25 MG/1
12.5 TABLET ORAL ONCE
Status: COMPLETED | OUTPATIENT
Start: 2017-07-09 | End: 2017-07-09

## 2017-07-09 RX ORDER — HYDROCHLOROTHIAZIDE 25 MG/1
25 TABLET ORAL DAILY
Status: DISCONTINUED | OUTPATIENT
Start: 2017-07-10 | End: 2017-07-10 | Stop reason: HOSPADM

## 2017-07-09 RX ADMIN — HEPARIN SODIUM 5000 UNITS: 5000 INJECTION, SOLUTION INTRAVENOUS; SUBCUTANEOUS at 18:31

## 2017-07-09 RX ADMIN — FLUTICASONE PROPIONATE 2 SPRAY: 50 SPRAY, METERED NASAL at 08:42

## 2017-07-09 RX ADMIN — HYDROCHLOROTHIAZIDE 12.5 MG: 25 TABLET ORAL at 18:31

## 2017-07-09 RX ADMIN — THERA TABS 1 TABLET: TAB at 08:42

## 2017-07-09 RX ADMIN — CITALOPRAM HYDROBROMIDE 20 MG: 20 TABLET ORAL at 08:40

## 2017-07-09 RX ADMIN — LATANOPROST 1 DROP: 50 SOLUTION/ DROPS OPHTHALMIC at 22:00

## 2017-07-09 RX ADMIN — POLYETHYLENE GLYCOL 3350 17 G: 17 POWDER, FOR SOLUTION ORAL at 08:42

## 2017-07-09 RX ADMIN — SIMVASTATIN 20 MG: 20 TABLET, FILM COATED ORAL at 21:13

## 2017-07-09 RX ADMIN — HYDROCHLOROTHIAZIDE 12.5 MG: 25 TABLET ORAL at 08:41

## 2017-07-09 RX ADMIN — METOPROLOL TARTRATE 25 MG: 25 TABLET ORAL at 08:41

## 2017-07-09 RX ADMIN — LOSARTAN POTASSIUM 50 MG: 50 TABLET, FILM COATED ORAL at 08:39

## 2017-07-09 RX ADMIN — HEPARIN SODIUM 5000 UNITS: 5000 INJECTION, SOLUTION INTRAVENOUS; SUBCUTANEOUS at 05:17

## 2017-07-09 RX ADMIN — HEPARIN SODIUM 5000 UNITS: 5000 INJECTION, SOLUTION INTRAVENOUS; SUBCUTANEOUS at 21:13

## 2017-07-09 NOTE — ROUTINE PROCESS
Bedside and Verbal shift change report given to [address] [place] [address],[address] (oncoming nurse) by Patience Rosario RN (offgoing nurse). Report included the following information SBAR, Kardex and MAR.

## 2017-07-09 NOTE — PROGRESS NOTES
Pt was received alert and oriented with some confusion and vitals were retaken and corrected bp was 168/85 and pt was  Cleaned and repositioned and was given breakfast  And tolerated well pt has no complaints of pain or discomfort and is verbally communicating responses and needs. Pt will continue to be monitored and plan of care will be followed.

## 2017-07-09 NOTE — PROGRESS NOTES
[facility] Hospitalist Group  Progress Note    Patient: Lily Suazo. Age: 80 y.o. : 1932 MR#: [id number] SSN: [id number]  Date/Time: 2017 5:27 PM    Subjective:     No F/C, N/V, CP, SOB. Seen with wife, daughter @ bedside. They wish to pursue placement/SNF for therapy efforts. Daughter reports that she's been trying to place pt and wife in long-term care. Pt otherwise appears well to them. Assessment/Plan:   1. Dehydration, heat exhaustion, RANDALL - resolved with fluids. Continue gentle hydration. No new issues. 2. Rib contusion - no fx seen on plain films. Pain controlled. ICS. 3. Mechanical fall - precautions. PT/OT/Dispo planning. 4. Dementia - by hx. No acute. 5. Ambulatory dysfxn - cannot ambulate safely. Disposition pending. Aim for xfer Monday. 6. HypoK+ - replete orally. 7. HTN - BPs elevated. Resumed ARB/HCTZ and will increase HCTZ today. 8. Chronic thrombocytopenia - stable. Following. 9. Hx multiple DVT - noted. Last BLE U/S 2016 shows no DVT. Has hx of RLE and LLE DVT in the past.   10. PT/OT/Dispo. Additional Notes:      Case discussed with:  [x]Patient  [x]Family  []Nursing  []Case Management  DVT Prophylaxis:  []Lovenox  [x]Hep SQ  []SCDs  []Coumadin   []On Heparin gtt    Objective:   VS:   Visit Vitals    BP (!) 183/96 (BP 1 Location: Left arm, BP Patient Position: At rest)    Pulse 60    Temp 98.2 °F (36.8 °C)    Resp 18    Ht 5' 8\" (1.727 m)    Wt 90.8 kg (200 lb 3.2 oz)    SpO2 99%    BMI 30.44 kg/m2      Tmax/24hrs: Temp (24hrs), Av.6 °F (36.4 °C), Min:96.4 °F (35.8 °C), Max:98.9 °F (37.2 °C)      Intake/Output Summary (Last 24 hours) at 17 1440  Last data filed at 17 1420   Gross per 24 hour   Intake              360 ml   Output              950 ml   Net             -590 ml       General:  Awake, alert, NAD. Cardiovascular:  RRR. Pulmonary:  CTA B.  GI:  Soft, NT/ND, NABS. Extremities:  No CT or edema. Additional:      Labs:    Recent Results (from the past 24 hour(s))   CBC WITH AUTOMATED DIFF    Collection Time: 07/09/17  2:06 AM   Result Value Ref Range    WBC 7.2 4.6 - 13.2 K/uL    RBC 4.16 (L) 4.70 - 5.50 M/uL    HGB 12.7 (L) 13.0 - 16.0 g/dL    HCT 37.7 36.0 - 48.0 %    MCV 90.6 74.0 - 97.0 FL    MCH 30.5 24.0 - 34.0 PG    MCHC 33.7 31.0 - 37.0 g/dL    RDW 12.6 11.6 - 14.5 %    PLATELET 241 (L) 353 - 420 K/uL    MPV 12.8 (H) 9.2 - 11.8 FL    NEUTROPHILS 72 40 - 73 %    LYMPHOCYTES 17 (L) 21 - 52 %    MONOCYTES 8 3 - 10 %    EOSINOPHILS 3 0 - 5 %    BASOPHILS 0 0 - 2 %    ABS. NEUTROPHILS 5.2 1.8 - 8.0 K/UL    ABS. LYMPHOCYTES 1.2 0.9 - 3.6 K/UL    ABS. MONOCYTES 0.6 0.05 - 1.2 K/UL    ABS. EOSINOPHILS 0.2 0.0 - 0.4 K/UL    ABS.  BASOPHILS 0.0 0.0 - 0.1 K/UL    DF AUTOMATED     METABOLIC PANEL, BASIC    Collection Time: 07/09/17  2:06 AM   Result Value Ref Range    Sodium 139 136 - 145 mmol/L    Potassium 3.6 3.5 - 5.5 mmol/L    Chloride 102 100 - 108 mmol/L    CO2 31 21 - 32 mmol/L    Anion gap 6 3.0 - 18 mmol/L    Glucose 101 (H) 74 - 99 mg/dL    BUN 18 7.0 - 18 MG/DL    Creatinine 1.00 0.6 - 1.3 MG/DL    BUN/Creatinine ratio 18 12 - 20      GFR est AA >60 >60 ml/min/1.73m2    GFR est non-AA >60 >60 ml/min/1.73m2    Calcium 8.7 8.5 - 10.1 MG/DL       Signed By: Caroline Nails MD     July 9, 2017 5:27 PM

## 2017-07-10 VITALS
RESPIRATION RATE: 18 BRPM | DIASTOLIC BLOOD PRESSURE: 90 MMHG | BODY MASS INDEX: 30.62 KG/M2 | HEIGHT: 68 IN | OXYGEN SATURATION: 99 % | HEART RATE: 59 BPM | WEIGHT: 202 LBS | TEMPERATURE: 97.8 F | SYSTOLIC BLOOD PRESSURE: 173 MMHG

## 2017-07-10 LAB
ANION GAP BLD CALC-SCNC: 5 MMOL/L (ref 3–18)
BASOPHILS # BLD AUTO: 0 K/UL (ref 0–0.1)
BASOPHILS # BLD: 0 % (ref 0–2)
BUN SERPL-MCNC: 19 MG/DL (ref 7–18)
BUN/CREAT SERPL: 18 (ref 12–20)
CALCIUM SERPL-MCNC: 8.7 MG/DL (ref 8.5–10.1)
CHLORIDE SERPL-SCNC: 103 MMOL/L (ref 100–108)
CO2 SERPL-SCNC: 31 MMOL/L (ref 21–32)
CREAT SERPL-MCNC: 1.04 MG/DL (ref 0.6–1.3)
DIFFERENTIAL METHOD BLD: ABNORMAL
EOSINOPHIL # BLD: 0.2 K/UL (ref 0–0.4)
EOSINOPHIL NFR BLD: 3 % (ref 0–5)
ERYTHROCYTE [DISTWIDTH] IN BLOOD BY AUTOMATED COUNT: 12.8 % (ref 11.6–14.5)
GLUCOSE SERPL-MCNC: 94 MG/DL (ref 74–99)
HCT VFR BLD AUTO: 38.6 % (ref 36–48)
HGB BLD-MCNC: 12.8 G/DL (ref 13–16)
LYMPHOCYTES # BLD AUTO: 17 % (ref 21–52)
LYMPHOCYTES # BLD: 1.2 K/UL (ref 0.9–3.6)
MCH RBC QN AUTO: 30.5 PG (ref 24–34)
MCHC RBC AUTO-ENTMCNC: 33.2 G/DL (ref 31–37)
MCV RBC AUTO: 92.1 FL (ref 74–97)
MONOCYTES # BLD: 0.6 K/UL (ref 0.05–1.2)
MONOCYTES NFR BLD AUTO: 8 % (ref 3–10)
NEUTS SEG # BLD: 5 K/UL (ref 1.8–8)
NEUTS SEG NFR BLD AUTO: 72 % (ref 40–73)
PLATELET # BLD AUTO: 104 K/UL (ref 135–420)
PMV BLD AUTO: 12.6 FL (ref 9.2–11.8)
POTASSIUM SERPL-SCNC: 3.7 MMOL/L (ref 3.5–5.5)
RBC # BLD AUTO: 4.19 M/UL (ref 4.7–5.5)
SODIUM SERPL-SCNC: 139 MMOL/L (ref 136–145)
WBC # BLD AUTO: 7 K/UL (ref 4.6–13.2)

## 2017-07-10 PROCEDURE — 97530 THERAPEUTIC ACTIVITIES: CPT

## 2017-07-10 PROCEDURE — 77010033678 HC OXYGEN DAILY

## 2017-07-10 PROCEDURE — 74011250636 HC RX REV CODE- 250/636: Performed by: INTERNAL MEDICINE

## 2017-07-10 PROCEDURE — 74011250637 HC RX REV CODE- 250/637: Performed by: FAMILY MEDICINE

## 2017-07-10 PROCEDURE — 36415 COLL VENOUS BLD VENIPUNCTURE: CPT | Performed by: FAMILY MEDICINE

## 2017-07-10 PROCEDURE — 80048 BASIC METABOLIC PNL TOTAL CA: CPT | Performed by: FAMILY MEDICINE

## 2017-07-10 PROCEDURE — 74011250637 HC RX REV CODE- 250/637: Performed by: INTERNAL MEDICINE

## 2017-07-10 PROCEDURE — 97116 GAIT TRAINING THERAPY: CPT

## 2017-07-10 PROCEDURE — 85025 COMPLETE CBC W/AUTO DIFF WBC: CPT | Performed by: FAMILY MEDICINE

## 2017-07-10 RX ORDER — TRAMADOL HYDROCHLORIDE 50 MG/1
50 TABLET ORAL
Qty: 30 TAB | Refills: 0 | Status: SHIPPED | OUTPATIENT
Start: 2017-07-10

## 2017-07-10 RX ORDER — POLYETHYLENE GLYCOL 3350 17 G/17G
17 POWDER, FOR SOLUTION ORAL DAILY
Qty: 14 EACH | Refills: 0 | Status: SHIPPED | OUTPATIENT
Start: 2017-07-10

## 2017-07-10 RX ORDER — HYDROCHLOROTHIAZIDE 25 MG/1
25 TABLET ORAL DAILY
Qty: 30 TAB | Refills: 1 | Status: SHIPPED | OUTPATIENT
Start: 2017-07-10

## 2017-07-10 RX ORDER — LOSARTAN POTASSIUM 50 MG/1
50 TABLET ORAL DAILY
Qty: 30 TAB | Refills: 1 | Status: SHIPPED | OUTPATIENT
Start: 2017-07-10

## 2017-07-10 RX ORDER — THERA TABS 400 MCG
1 TAB ORAL DAILY
Qty: 30 TAB | Refills: 1 | Status: SHIPPED | OUTPATIENT
Start: 2017-07-10

## 2017-07-10 RX ADMIN — HYDROCHLOROTHIAZIDE 25 MG: 25 TABLET ORAL at 08:12

## 2017-07-10 RX ADMIN — CITALOPRAM HYDROBROMIDE 20 MG: 20 TABLET ORAL at 08:12

## 2017-07-10 RX ADMIN — THERA TABS 1 TABLET: TAB at 08:12

## 2017-07-10 RX ADMIN — HEPARIN SODIUM 5000 UNITS: 5000 INJECTION, SOLUTION INTRAVENOUS; SUBCUTANEOUS at 14:00

## 2017-07-10 RX ADMIN — METOPROLOL TARTRATE 25 MG: 25 TABLET ORAL at 08:12

## 2017-07-10 RX ADMIN — POLYETHYLENE GLYCOL 3350 17 G: 17 POWDER, FOR SOLUTION ORAL at 08:15

## 2017-07-10 RX ADMIN — LOSARTAN POTASSIUM 50 MG: 50 TABLET, FILM COATED ORAL at 08:12

## 2017-07-10 RX ADMIN — HEPARIN SODIUM 5000 UNITS: 5000 INJECTION, SOLUTION INTRAVENOUS; SUBCUTANEOUS at 05:55

## 2017-07-10 NOTE — PROGRESS NOTES
1107- No nausea or vomiting. Patient voices no complaint of pain. Resting in bed, will continue to monitor.

## 2017-07-10 NOTE — PROGRESS NOTES
Problem: Mobility Impaired (Adult and Pediatric)  Goal: *Acute Goals and Plan of Care (Insert Text)  STGs to be addressed within 3 days:  1. Bed mobility: Supine to sit to supine min/CGA with HR for meals. 2. Activity tolerance: Tolerate up in chair 1-2 hrs for ADLs. 3. Transfers: Sit to stand to chair min/CGA with RW for ADLs. LTGs to be addressed within 7 days:  1. Standing/Ambulation Balance: Increase to Good with RW for safe transfers and gait. 2. Ambulation: Ambulate > 50 ft min/CGA with RW for home mobility. 3. Patient Education: Independent with HEP for home safety. PHYSICAL THERAPY TREATMENT     Patient: Dvay Gipson (80 y.o. male)  Date: 7/10/2017  Diagnosis: Heat exhaustion  RANDALL (acute kidney injury) ([facility] [address]  Dehydration  Dementia  Rib contusion, left, subsequent encounter  Ambulatory dysfunction <principal problem not specified>       Precautions: Fall, Skin  Chart, physical therapy assessment, plan of care and goals were reviewed. ASSESSMENT:  Pt presents today alert and agreeable to therapy in room with NC O2 donned (NC remained on for duration of session). Pt transferred from sup to sit with Ishmael and sat EOB apprx 2mins to allow minimal dizziness to tresa with visual fixation and deep breathing techniques. Pt reported improvement of sx then stood from bed with Ishmael. Once standing at RW pt denied dizziness and began ambulation, after apprx 5ft pt reported needing to have a bowel movement. Pt ambulated back to locked recliner and upon PT command to reach arms back for chair and sit, pt would not release RW from his hands. PT assisted patient with CNA in room with MinAx2. Once pt sitting in chair PT inquired if patient was feeling okay to which pt states, \"I didn't want to fall. \" Pt denied need for further assistance and CNA in room as PT brought BSC back to pt's room. Pt stood for locked recliner with Ishmael and used RW to stand step to [facility].  When pt instructed to reach arms back for BS, pt again would not release  of RW and would not sit. Pt assisted back to sitting with MinAx2. Pt sitting in chair and was responsive, answering PT as to his name. At this time VS taken as pt reporting dizziness and appears pale in the face. SaO2 was 95% on NC O2, HR 68bpm, and BP 79/46mmHg. At this time, YARI Oneal notified of pt's BP. Pt continues to answer correctly place and voluntarily offers that Laura Cordova is president. Attempted to take pt's BP 2nd time after 2mins to determine if pt was safe to transfer back to bed; electronic BP unable to read. Pt reports feeling worse and states his back is hurting from sitting. YARI Oneal notified again and entered room. Pt able to state his name, however did not follow therapist's finger. YARI Sands entered room and pt was assisted back to bed; pt was able to stand with Ishmael and 2 standby for safety concerns 2/2 to patient symptoms. HOB laid flat and therapy session aborted at this time and pt continuing to appropriately respond to questions. Proper handoff to YARI Oneal and Vaioni. Progression toward goals:  [X]      Improving appropriately and progressing toward goals  [ ]      Improving slowly and progressing toward goals  [ ]      Not making progress toward goals and plan of care will be adjusted       PLAN:  Patient continues to benefit from skilled intervention to address the above impairments. Continue treatment per established plan of care. Discharge Recommendations:  Pranay Gunter  Further Equipment Recommendations for Discharge:  N/A       G-CODES:      Mobility  Current  CJ= 20-39%   Goal  CI= 1-19%. The severity rating is based on the Level of Assistance required for Functional Mobility and ADLs. SUBJECTIVE:   Patient stated I get dizzy sometimes when I stand up.  when PT inquired about sx after transfer to [facility].       OBJECTIVE DATA SUMMARY:   Critical Behavior:  Neurologic State: Alert  Orientation Level: Oriented X4  Cognition: Follows commands, Memory loss  Safety/Judgement: Fall prevention, Decreased insight into deficits, Decreased awareness of need for assistance  Functional Mobility Training:  Bed Mobility:   Supine to Sit: Minimum assistance  Sit to Supine: Minimum assistance  Scooting: Contact guard assistance; Additional time   Transfers:  Sit to Stand: Minimum assistance  Stand to Sit: Minimum assistance; Moderate assistance      Balance:  Sitting: Intact; With support  Standing: Impaired; With support  Standing - Static: Fair  Standing - Dynamic : Fair  Ambulation/Gait Training:  Distance (ft): 10 Feet (ft)  Assistive Device: Walker, rolling  Ambulation - Level of Assistance: Contact guard assistance;Minimal assistance   Gait Abnormalities: Decreased step clearance;Shuffling gait   Base of Support: Narrowed   Speed/Luz: Pace decreased (<100 feet/min); Shuffled; Slow   Interventions: Safety awareness training;Verbal cues; Visual/Demos     Pain:  Pt reports 4/10 pain or discomfort prior to treatment. Pt reports 4/10 pain or discomfort post treatment. (back pain from sitting on BSC)     Activity Tolerance:   Pt demonstrated poor tolerance to activity this session. Pt experienced drop in BP and was symptomatic during session. Pt BP upon sitting on BSC was 79/46mmHg. This session was terminated due to patient sx of dizziness and decreased BP. YARI Szymanski and YARI Santizo in room and proper handoff performed at this time. Please refer to the flowsheet for vital signs taken during this treatment.   After treatment:   [ ] Patient left in no apparent distress sitting up in chair  [X] Patient left in no apparent distress in bed  [X] Call bell left within reach  [X] Nursing notified and present in room  [ ] Caregiver present  [ ] Bed alarm activated      Quintin Munguia PT   Time Calculation: 44 mins

## 2017-07-10 NOTE — ROUTINE PROCESS
TRANSFER - OUT REPORT:    Verbal report given to Fariha Najera LPN(name) on Sherryle Adjutant.  being transferred [facility] for routine progression of care       Report consisted of patients Situation, Background, Assessment and   Recommendations(SBAR). Information from the following report(s) SBAR, ED Summary, Intake/Output and MAR was reviewed with the receiving nurse. Lines:   Peripheral IV 07/06/17 Left Hand (Active)   Site Assessment Clean, dry, & intact 7/10/2017  8:13 AM   Phlebitis Assessment 0 7/10/2017  8:13 AM   Infiltration Assessment 0 7/10/2017  8:13 AM   Dressing Status Clean, dry, & intact 7/10/2017  8:13 AM   Dressing Type Tape;Transparent 7/10/2017  8:13 AM   Hub Color/Line Status Yellow;Capped;Flushed 7/10/2017  8:13 AM   Alcohol Cap Used Yes 7/10/2017  8:13 AM        Opportunity for questions and clarification was provided.       Patient transported with: Medical Transport

## 2017-07-10 NOTE — PROGRESS NOTES
Care Management Interventions  PCP Verified by CM: Yes  Mode of Transport at Discharge: BLS  Transition of Care Consult (CM Consult): Discharge Planning, SNF  MyChart Signup: No  Discharge Durable Medical Equipment: No  Physical Therapy Consult: Yes  Occupational Therapy Consult: No  Speech Therapy Consult: No  Current Support Network: Lives with Spouse, Own Home  Confirm Follow Up Transport: Other (see comment) (medical transport)  Plan discussed with Pt/Family/Caregiver: Yes  Freedom of Choice Offered: Yes  Discharge Location  Discharge Placement: Skilled nursing facility    Left message for daughter Adelita Keenan in ref to patient being d/cd today to [facility]. P/u time of 1630, [facility] Admission Coordinator [phone] notified. 3:02 PM' Spoke with daughter Adelita Keenan, informed of patient d/c today. Spoke with Luis Arroyo in [facility] to confirm referral for assistance with DEREK application. Luis Arroyo states that she has spoken with the daughter.

## 2017-07-10 NOTE — DISCHARGE INSTRUCTIONS
Dehydration: Care Instructions  Your Care Instructions  Dehydration happens when your body loses too much fluid. This might happen when you do not drink enough water or you lose large amounts of fluids from your body because of diarrhea, vomiting, or sweating. Severe dehydration can be life-threatening. Water and minerals called electrolytes help put your body fluids back in balance. Learn the early signs of fluid loss, and drink more fluids to prevent dehydration. Follow-up care is a key part of your treatment and safety. Be sure to make and go to all appointments, and call your doctor if you are having problems. It's also a good idea to know your test results and keep a list of the medicines you take. How can you care for yourself at home? · To prevent dehydration, drink plenty of fluids, enough so that your urine is light yellow or clear like water. Choose water and other caffeine-free clear liquids until you feel better. If you have kidney, heart, or liver disease and have to limit fluids, talk with your doctor before you increase the amount of fluids you drink. · If you do not feel like eating or drinking, try taking small sips of water, sports drinks, or other rehydration drinks. · Get plenty of rest.  To prevent dehydration  · Add more fluids to your diet and daily routine, unless your doctor has told you not to. · During hot weather, drink more fluids. Drink even more fluids if you exercise a lot. Stay away from drinks with alcohol or caffeine. · Watch for the symptoms of dehydration. These include:  ¨ A dry, sticky mouth. ¨ Dark yellow urine, and not much of it. ¨ Dry and sunken eyes. ¨ Feeling very tired. · Learn what problems can lead to dehydration. These include:  ¨ Diarrhea, fever, and vomiting. ¨ Any illness with a fever, such as pneumonia or the flu. ¨ Activities that cause heavy sweating, such as endurance races and heavy outdoor work in hot or humid weather.   ¨ Alcohol or drug abuse or withdrawal.  ¨ Certain medicines, such as cold and allergy pills (antihistamines), diet pills (diuretics), and laxatives. ¨ Certain diseases, such as diabetes, cancer, and heart or kidney disease. When should you call for help? Call 911 anytime you think you may need emergency care. For example, call if:  · You passed out (lost consciousness). Call your doctor now or seek immediate medical care if:  · You are confused and cannot think clearly. · You are dizzy or lightheaded, or you feel like you may faint. · You have signs of needing more fluids. You have sunken eyes and a dry mouth, and you pass only a little dark urine. · You cannot keep fluids down. Watch closely for changes in your health, and be sure to contact your doctor if:  · You are not making tears. · Your skin is very dry and sags slowly back into place after you pinch it. · Your mouth and eyes are very dry. Where can you learn more? Go to http://leela-estela.info/. Enter S228 in the search box to learn more about \"Dehydration: Care Instructions. \"  Current as of: March 20, 2017  Content Version: 11.3  © 0335-9439 [employer]. Care instructions adapted under license by [employer] (which disclaims liability or warranty for this information). If you have questions about a medical condition or this instruction, always ask your healthcare professional. [place] [address] any warranty or liability for your use of this information. Heat Exhaustion: Care Instructions  Your Care Instructions  Heat exhaustion occurs when you are hot, sweat a lot, and do not drink enough to replace the lost fluids. Heat exhaustion is not the same as heatstroke, which is much more serious. Heatstroke can lead to problems with many different organs and can be life-threatening.   After medical care for heat exhaustion, you will need to limit your activities and take good care of your body while it recovers. Follow-up care is a key part of your treatment and safety. Be sure to make and go to all appointments, and call your doctor if you are having problems. Its also a good idea to know your test results and keep a list of the medicines you take. How can you care for yourself at home? · Reduce your activities, and get plenty of rest. Your doctor will give you instructions on when you can resume your normal schedule. · Stay in a cool room for at least the next 24 hours. · Drink rehydration drinks, juices, and water to replace fluids. Drinks such as sports drinks that contain electrolytes work best, because they have salt and minerals. You need salt and minerals as well as water. You are drinking enough fluids when your urine is normal in color (light yellow or clear), and you are urinating every 2 to 4 hours. If you have kidney, heart, or liver disease and have to limit fluids or salt, talk with your doctor before you increase your fluid or salt intake. · Avoid drinks that have caffeine or alcohol. To prevent heat exhaustion  · Drink plenty of fluids, enough so that your urine is light yellow or clear like water. If you have kidney, heart, or liver disease and have to limit fluids, talk with your doctor before you increase the amount of fluids you drink. · Drink plenty of water before, during, and after you are active. This is very important when it is hot out and when you do intense exercise. · During hot weather, wear light-colored clothing that fits loosely and a hat with a brim to reflect the sun. · Limit or avoid strenuous activity during hot or humid weather, especially during the hottest part of the day (10 a.m. to 4 p.m.). Heat exhaustion and heatstroke usually develop when you are working or exercising in hot weather. Humidity makes hot weather even more dangerous. · Cars can get very hot inside. Open the windows or turn on the air conditioning before you get in and close the doors.   · Try to stay cool during hot weather. If your home is not air-conditioned, seek an air-conditioned place. That could be in [employer], a neighborhood café, or a friend's home. [place] yourself with a cool mist. Take a cool shower, bath, or sponge bath. · Be aware that some medicines, such as major tranquilizers, can raise the risk of heat exhaustion. Ask your doctor whether any medicine you take raises your chance of getting heat exhaustion. When should you call for help? Call 911 anytime you think you may need emergency care. For example, call if:  · You feel very hot and:  ¨ You have a seizure. ¨ You feel confused. ¨ Your skin is red, hot, and dry. ¨ You passed out (lost consciousness). Call your doctor now or seek immediate medical care if:  · You cannot keep fluids down. · After returning to your normal activities, you have symptoms of heat exhaustion, such as sweating a lot, fatigue, dizziness, or nausea. Watch closely for changes in your health, and be sure to contact your doctor if:  · You do not get better as expected. Where can you learn more? Go to http://leelaHypercontextestela.info/. Enter S222 in the search box to learn more about \"Heat Exhaustion: Care Instructions. \"  Current as of: March 20, 2017  Content Version: 11.3  © 7261-9093 "[employer]. Care instructions adapted under license by [employer] (which disclaims liability or warranty for this information). If you have questions about a medical condition or this instruction, always ask your healthcare professional. [place] [address] any warranty or liability for your use of this information. Chest Contusion: Care Instructions  Your Care Instructions  A chest contusion, or bruise, is caused by a fall or direct blow to the chest. Car crashes, falls, getting punched, and injury from bicycle handlebars are common causes of chest contusions.  A very forceful blow to the chest can injure the heart or blood vessels in the chest, the lungs, the airway, the liver, or the spleen. Pain may be caused by an injury to muscles, cartilage, or ribs. Deep breathing, coughing, or sneezing can increase your pain. Lying on the injured area also can cause pain. Follow-up care is a key part of your treatment and safety. Be sure to make and go to all appointments, and call your doctor if you are having problems. It's also a good idea to know your test results and keep a list of the medicines you take. How can you care for yourself at home? · Rest and protect the injured or sore area. Stop, change, or take a break from any activity that may be causing your pain. · Put ice or a cold pack on the area for 10 to 20 minutes at a time. Put a thin cloth between the ice and your skin. · After 2 or 3 days, if your swelling is gone, apply a heating pad set on low or a warm cloth to your chest. Some doctors suggest that you go back and forth between hot and cold. Put a thin cloth between the heating pad and your skin. · Do not wrap or tape your ribs for support. This may cause you to take smaller breaths, which could increase your risk of pneumonia and lung collapse. · Ask your doctor if you can take an over-the-counter pain medicine, such as acetaminophen (Tylenol), ibuprofen (Advil, Motrin), or naproxen (Aleve). Be safe with medicines. Read and follow all instructions on the label. · Take your medicines exactly as prescribed. Call your doctor if you think you are having a problem with your medicine. · Gentle stretching and massage may help you feel better after a few days of rest. Stretch slowly to the point just before discomfort begins, then hold the stretch for at least 15 to 30 seconds. Do this 3 or 4 times per day. · As your pain gets better, slowly return to your normal activities.  Be patient, because chest bruises can take weeks or months to heal. Any increased pain may be a sign that you need to rest a while longer. When should you call for help? Call 911 anytime you think you may need emergency care. For example, call if:  · You have severe trouble breathing. · You cough up blood. Call your doctor now or seek immediate medical care if:  · You have belly pain. · You are dizzy or lightheaded, or you feel like you may faint. · You develop new symptoms with the chest pain. · Your chest pain gets worse. · You have a fever. · You have some shortness of breath. · You have a cough that brings up mucus from the lungs. Watch closely for changes in your health, and be sure to contact your doctor if:  · Your chest pain is not improving after 1 week. Where can you learn more? Go to http://leela-estela.info/. Enter I174 in the search box to learn more about \"Chest Contusion: Care Instructions. \"  Current as of: March 20, 2017  Content Version: 11.3  © 2528-4631 [employer]. Care instructions adapted under license by [employer] (which disclaims liability or warranty for this information). If you have questions about a medical condition or this instruction, always ask your healthcare professional. [address] any warranty or liability for your use of this information. Patient armband removed and given to patient to take home. Patient was informed of the privacy risks if armband lost or stolen    [employer] Activation    Thank you for requesting access to [employer]. Please follow the instructions below to securely access and download your online medical record. [employer] allows you to send messages to your doctor, view your test results, renew your prescriptions, schedule appointments, and more. How Do I Sign Up? 1. In your internet browser, go to [email or web address]  2. Click on the First Time User? Click Here link in the Sign In box. You will be redirect to the New Member Sign Up page. 3. Enter your [employer] Access Code exactly as it appears below. You will not need to use this code after youve completed the sign-up process. If you do not sign up before the expiration date, you must request a new code. [facility] Access Code: [email or web address]  Expires: 2017 10:52 AM (This is the date your [facility] access code will )    4. Enter the last four digits of your Social Security Number (xxxx) and Date of Birth (mm/dd/yyyy) as indicated and click Submit. You will be taken to the next sign-up page. 5. Create a [facility] ID. This will be your [facility] login ID and cannot be changed, so think of one that is secure and easy to remember. 6. Create a [facility] password. You can change your password at any time. 7. Enter your Password Reset Question and Answer. This can be used at a later time if you forget your password. 8. Enter your e-mail address. You will receive e-mail notification when new information is available in [address]. 9. Click Sign Up. You can now view and download portions of your medical record. 10. Click the Download Summary menu link to download a portable copy of your medical information. Additional Information    If you have questions, please visit the Frequently Asked Questions section of the [employer] website at [email or web address]. [employer]/. Remember, [facility] is NOT to be used for urgent needs. For medical emergencies, dial 911.       DISCHARGE SUMMARY from Nurse    The following personal items are in your possession at time of discharge:    Dental Appliances: None  Visual Aid: None     Home Medications: None  Jewelry: None  Clothing: Pants, Shirt, Footwear  Other Valuables: None             PATIENT INSTRUCTIONS:    After general anesthesia or intravenous sedation, for 24 hours or while taking prescription Narcotics:  · Limit your activities  · Do not drive and operate hazardous machinery  · Do not make important personal or business decisions  · Do  not drink alcoholic beverages  · If you have not urinated within 8 hours after discharge, please contact your surgeon on call. Report the following to your surgeon:  · Excessive pain, swelling, redness or odor of or around the surgical area  · Temperature over 100.5  · Nausea and vomiting lasting longer than 4 hours or if unable to take medications  · Any signs of decreased circulation or nerve impairment to extremity: change in color, persistent  numbness, tingling, coldness or increase pain  · Any questions        What to do at Home:  Recommended activity: Activity as tolerated    If you experience any of the following symptoms Nausea, vomiting, diarrhea, fever greater than 100.5, shortness of breath, increased pain, please follow up with PCP. *  Please give a list of your current medications to your Primary Care Provider. *  Please update this list whenever your medications are discontinued, doses are      changed, or new medications (including over-the-counter products) are added. *  Please carry medication information at all times in case of emergency situations. These are general instructions for a healthy lifestyle:    No smoking/ No tobacco products/ Avoid exposure to second hand smoke    Surgeon General's Warning:  Quitting smoking now greatly reduces serious risk to your health. Obesity, smoking, and sedentary lifestyle greatly increases your risk for illness    A healthy diet, regular physical exercise & weight monitoring are important for maintaining a healthy lifestyle    You may be retaining fluid if you have a history of heart failure or if you experience any of the following symptoms:  Weight gain of 3 pounds or more overnight or 5 pounds in a week, increased swelling in our hands or feet or shortness of breath while lying flat in bed. Please call your doctor as soon as you notice any of these symptoms; do not wait until your next office visit.     Recognize signs and symptoms of STROKE:    F-face looks uneven    A-arms unable to move or move unevenly    S-speech slurred or non-existent    T-time-call 911 as soon as signs and symptoms begin-DO NOT go       Back to bed or wait to see if you get better-TIME IS BRAIN. Warning Signs of HEART ATTACK     Call 911 if you have these symptoms:   Chest discomfort. Most heart attacks involve discomfort in the center of the chest that lasts more than a few minutes, or that goes away and comes back. It can feel like uncomfortable pressure, squeezing, fullness, or pain.  Discomfort in other areas of the upper body. Symptoms can include pain or discomfort in one or both arms, the back, neck, jaw, or stomach.  Shortness of breath with or without chest discomfort.  Other signs may include breaking out in a cold sweat, nausea, or lightheadedness. Don't wait more than five minutes to call 911 - MINUTES MATTER! Fast action can save your life. Calling 911 is almost always the fastest way to get lifesaving treatment. Emergency Medical Services staff can begin treatment when they arrive -- up to an hour sooner than if someone gets to the hospital by car. The discharge information has been reviewed with the patient and caregiver. The patient and caregiver verbalized understanding. Discharge medications reviewed with the patient and caregiver and appropriate educational materials and side effects teaching were provided.

## 2017-07-10 NOTE — ROUTINE PROCESS
Bedside and Verbal shift change report given to RN (oncoming nurse) by Samantha Gillespie RN (offgoing nurse). Report included the following information SBAR, Kardex, MAR and Recent Results. SITUATION:    Code Status: Full Code   Reason for Admission: Heat exhaustion   [facility]ALL [facility]acute [facility]) ([facility] [address]   Dehydration   Dementia   Rib contusion, left, subsequent encounter   Ambulatory dysfunction    [facility] day: 4   Problem List:       Hospital Problems  Date Reviewed: 6/6/2017          Codes Class Noted POA    Ambulatory dysfunction ICD-10-CM: R26.2  ICD-9-CM: 719.7  7/6/2017 Unknown        Dehydration ICD-10-CM: E86.0  ICD-9-CM: 276.51  7/4/2017 Unknown        Heat exhaustion ICD-10-CM: T67. 5XXA  ICD-9-CM: 992.5  7/4/2017 Unknown        Rib contusion ICD-10-CM: S20.219A  ICD-9-CM: 922.1  7/4/2017 Unknown        Dementia ICD-10-CM: F03.90  ICD-9-CM: 294.20  7/4/2017 Unknown        [facility] ICD-10-CM: N17.9  ICD-9-CM: 584.9  7/4/2017 Unknown              BACKGROUND:    Past Medical History:   Past Medical History:   Diagnosis Date    Abnormal myocardial perfusion study 04/12/2012    Small, mild mid to distal anterior septal defect concerning for ischemia. Mild mid to distal anterior septal hypk. EF 61%. Neg EKG on pharm stress test.  Low to intermediate risk.  Arthritis     CAD (coronary artery disease) December 2009    presented with anterior wall STEMI with subsequent 100% proximal LAD thrombotic lesion stented to residual 0% using 3.5-mm Cypher stent (13-mm length). He only had mild disease in his left main, RCA, and circumflex coronary artery.  Chronic kidney disease     Depressive disorder     Dyslipidemia     Gout     History of echocardiogram 06/25/2013    EF 55-60%. No RWMA. Normal diastolic function. Sm pericardial effusion (also noted on echo 4/26/10), without tamponade physiology.     Hypercoagulable state ([facility] [address]     DVT x 2 (L) 2/2014     Hypertension  Hypertrophy (benign) of prostate     Lower extremity venous duplex 07/05/2013    Right leg:  Acute, occlusive DVT in posterior tibial vein. Pulsatile flow.  Neuropathy     Normal ankle brachial index 01/16/2012    No significant peripheral arterial disease bilaterally. R BHANU 1.31.  L BHANU 1.31.    Pacemaker 04/27/10    Implantation of dual-chamber Medtronic pacemaker    S/P cardiac cath 12/28/2009    oRCA 30%. LM patent. CX patent. LAD p100% thrombotic (Pronto thrombectomy, 3.5 x 13 Cypher stent). LVEDP 26.  EF 60%. Min anteroapical hypk.       Sinus bradycardia     pacemaker in 2010    Thromboembolus [facility]) 2011    h/o DVT x2 to LLE during surgery for Right TKR    Venous (peripheral) insufficiency          Patient taking anticoagulants yes     ASSESSMENT:    Changes in Assessment Throughout Shift: none     Patient has Central Line: no Reasons if yes:    Patient has Strong Cath: yes Reasons if yes: obstruction      Last Vitals:     Vitals:    07/09/17 1615 07/09/17 1707 07/09/17 2119 07/10/17 0122   BP:  165/80 150/77 147/81   Pulse:  60 (!) 59 61   Resp: 18 18 18 18   Temp:   98.5 °F (36.9 °C) 98.1 °F (36.7 °C)   SpO2: 99% 99% 99% 98%   Weight:       Height:            IV and DRAINS (will only show if present)   [REMOVED] Peripheral IV 07/04/17 Left Hand-Site Assessment: Other (Comment) (old blood around IV site)  Peripheral IV 07/06/17 Left Hand-Site Assessment: Clean, dry, & intact     WOUND (if present)   Wound Type:  none   Dressing present Dressing Present : No   Wound Concerns/Notes:  none     PAIN    Pain Assessment    Pain Intensity 1: 0 (07/09/17 2150)    Pain Location 1: Abdomen    Pain Intervention(s) 1: Medication (see MAR)    Patient Stated Pain Goal: 0  o Interventions for Pain:  none  o Intervention effective: n/a  o Time of last intervention: See MAR   o Reassessment Completed: n/a     Last 3 Weights:  Last 3 Recorded Weights in this Encounter    07/06/17 0552 07/07/17 5400 07/09/17 8769   Weight: 91.4 kg (201 lb 9.6 oz) 92.5 kg (204 lb) 90.8 kg (200 lb 3.2 oz)     Weight change:      INTAKE/OUPUT    Current Shift: 07/09 1901 - 07/10 0700  In: 90 [P.O.:90]  Out: -     Last three shifts: 07/08 0701 - 07/09 1900  In: 600 [P.O.:600]  Out: 1100 [Urine:1100]     LAB RESULTS     Recent Labs      07/09/17   0206  07/08/17 0217 07/07/17   0413   WBC  7.2  7.1  7.0   HGB  12.7*  12.7*  12.3*   HCT  37.7  38.0  36.7   PLT  100*  88*  84*        Recent Labs      07/09/17 0206 07/08/17 0217 07/07/17 0413   NA  139  140  141   K  3.6  3.6  3.9   GLU  101*  85  86   BUN  18  14  15   CREA  1.00  1.05  0.88   CA  8.7  8.0*  8.1*       RECOMMENDATIONS AND DISCHARGE PLANNING     1. Pending tests/procedures/ Plan of Care or Other Needs: TBD     2. Discharge plan for patient and Needs/Barriers: TBD    3. Estimated Discharge Date: TBD  Posted on Whiteboard in Patients Room: no      4. The patient's care plan was reviewed with the oncoming nurse. \"HEALS\" SAFETY CHECK      Fall Risk    Total Score: 2    Safety Measures: Safety Measures: Bed/Chair-Wheels locked, Bed in low position, Call light within reach, Side rails X 3    A safety check occurred in the patient's room between off going nurse and oncoming nurse listed above.     The safety check included the below items  Area Items   H  High Alert Medications - Verify all high alert medication drips (heparin, PCA, etc.)   E  Equipment - Suction is set up for ALL patients (with yanker)  - Red plugs utilized for all equipment (IV pumps, etc.)  - WOWs wiped down at end of shift.  - Room stocked with oxygen, suction, and other unit-specific supplies   A  Alarms - Bed alarm is set for fall risk patients  - Ensure chair alarm is in place and activated if patient is up in a chair   L  Lines - Check IV for any infiltration  - Strong bag is empty if patient has a Strong   - Tubing and IV bags are labeled   S  Safety   - Room is clean, patient is clean, and equipment is clean. - Hallways are clear from equipment besides carts. - Fall bracelet on for fall risk patients  - Ensure room is clear and free of clutter  - Suction is set up for ALL patients (with stephanie)  - Hallways are clear from equipment besides carts.    - Isolation precautions followed, supplies available outside room, sign posted     Srinivas Mathews RN

## 2017-07-10 NOTE — DISCHARGE SUMMARY
[facility]  [facility]       Patient: Ashok Mora. Age: 80 y.o. : 1932 MR#: [id number] [id number]: [id number]  PCP on record: Mikael Olivier MD  Admit date: 2017  Discharge date: 7/10/2017    Consults:    None. Procedures:  None. Significant Diagnostic Studies:   None. Discharge Diagnoses:                                           1. Dehydration, heat exhaustion, RANDALL  2. Rib contusion  3. Mechanical fall  4. Dementia  5. Ambulatory dysfxn  6. HypoK+  7. HTN  8. Chronic thrombocytopenia   9. Hx multiple DVT. Hospital Course by Problem   1. Dehydration, heat exhaustion, RANDALL - resolved with fluids. Continue gentle hydration. No new issues. Principal reason for admission. 2. Rib contusion - no fx seen on plain films. Pain controlled. ICS. Continue Ultram as needed. Resulted from #3. 3. Mechanical fall - precautions for falls. Continue PT/OT. 4. Dementia - by hx. No acute. At baseline, as seen with family previously. 5. Ambulatory dysfxn - cannot ambulate safely. PT/OT efforts at SNF. 6. HypoK+ - repleted orally. 7. HTN - BPs elevated. Resumed outpt medications, increased HCTZ dose. Follow for effect. 8. Chronic thrombocytopenia - stable. No acute issues. 9. Hx multiple DVT - noted. Last BLE U/S 2016 shows no DVT. Has hx of RLE and LLE DVT in the past.           Today's examination of the patient revealed:     Subjective:     No F/C, N/V, CP, SOB. No c/o otherwise.      Objective:   VS:   Visit Vitals    /82    Pulse 78    Temp 98.6 °F (37 °C)    Resp 18    Ht 5' 8\" (1.727 m)    Wt 91.6 kg (202 lb)    SpO2 99%    BMI 30.71 kg/m2      Tmax/24hrs: Temp (24hrs), Av.2 °F (36.8 °C), Min:97.7 °F (36.5 °C), Max:98.6 °F (37 °C)     Input/Output:   Intake/Output Summary (Last 24 hours) at 07/10/17 1334  Last data filed at 17 2150   Gross per 24 hour   Intake              210 ml   Output                0 ml   Net 210 ml       General:  Awake, alert, NAD. Cardiovascular:  RRR. Pulmonary:  CTA B.  GI:  Soft, NT/ND, NABS. Extremities:  No CT or edema. Additional:      Labs:    Recent Results (from the past 24 hour(s))   CBC WITH AUTOMATED DIFF    Collection Time: 07/10/17  2:40 AM   Result Value Ref Range    WBC 7.0 4.6 - 13.2 K/uL    RBC 4.19 (L) 4.70 - 5.50 M/uL    HGB 12.8 (L) 13.0 - 16.0 g/dL    HCT 38.6 36.0 - 48.0 %    MCV 92.1 74.0 - 97.0 FL    MCH 30.5 24.0 - 34.0 PG    MCHC 33.2 31.0 - 37.0 g/dL    RDW 12.8 11.6 - 14.5 %    PLATELET 798 (L) 910 - 420 K/uL    MPV 12.6 (H) 9.2 - 11.8 FL    NEUTROPHILS 72 40 - 73 %    LYMPHOCYTES 17 (L) 21 - 52 %    MONOCYTES 8 3 - 10 %    EOSINOPHILS 3 0 - 5 %    BASOPHILS 0 0 - 2 %    ABS. NEUTROPHILS 5.0 1.8 - 8.0 K/UL    ABS. LYMPHOCYTES 1.2 0.9 - 3.6 K/UL    ABS. MONOCYTES 0.6 0.05 - 1.2 K/UL    ABS. EOSINOPHILS 0.2 0.0 - 0.4 K/UL    ABS. BASOPHILS 0.0 0.0 - 0.1 K/UL    DF AUTOMATED     METABOLIC PANEL, BASIC    Collection Time: 07/10/17  2:40 AM   Result Value Ref Range    Sodium 139 136 - 145 mmol/L    Potassium 3.7 3.5 - 5.5 mmol/L    Chloride 103 100 - 108 mmol/L    CO2 31 21 - 32 mmol/L    Anion gap 5 3.0 - 18 mmol/L    Glucose 94 74 - 99 mg/dL    BUN 19 (H) 7.0 - 18 MG/DL    Creatinine 1.04 0.6 - 1.3 MG/DL    BUN/Creatinine ratio 18 12 - 20      GFR est AA >60 >60 ml/min/1.73m2    GFR est non-AA >60 >60 ml/min/1.73m2    Calcium 8.7 8.5 - 10.1 MG/DL     Additional Data Reviewed:     Condition:   Disposition:    []Home   []Home with Home Health   [x]SNF/NH   []Rehab   []Home with family   []Alternate Facility:____________________      Discharge Medications:     Current Discharge Medication List      START taking these medications    Details   hydroCHLOROthiazide (HYDRODIURIL) 25 mg tablet Take 1 Tab by mouth daily. Qty: 30 Tab, Refills: 1      losartan (COZAAR) 50 mg tablet Take 1 Tab by mouth daily.   Qty: 30 Tab, Refills: 1      polyethylene glycol (MIRALAX) 17 gram packet Take 1 Packet by mouth daily. Qty: 14 Each, Refills: 0      therapeutic multivitamin (THERAGRAN) tablet Take 1 Tab by mouth daily. Qty: 30 Tab, Refills: 1         CONTINUE these medications which have CHANGED    Details   traMADol (ULTRAM) 50 mg tablet Take 1 Tab by mouth every eight (8) hours as needed for Pain. Max Daily Amount: 150 mg.  Qty: 30 Tab, Refills: 0         CONTINUE these medications which have NOT CHANGED    Details   citalopram (CELEXA) 20 mg tablet Take  by mouth daily. metoprolol tartrate (LOPRESSOR) 25 mg tablet Take  by mouth two (2) times a day. aspirin delayed-release 81 mg tablet Take 1 Tab by mouth daily. Qty: 30 Tab, Refills: 0      fluticasone (FLONASE) 50 mcg/actuation nasal spray use in each nostril  Qty: 1 Bottle, Refills: 0      meclizine (ANTIVERT) 25 mg tablet Take 1 Tab by mouth three (3) times daily as needed for Dizziness. Qty: 30 Tab, Refills: 0      simvastatin (ZOCOR) 20 mg tablet Take 1 Tab by mouth nightly. Qty: 90 Tab, Refills: 3      latanoprost (XALATAN) 0.005 % ophthalmic solution Administer 1 Drop to both eyes nightly. STOP taking these medications       guaiFENesin-codeine (CHERATUSSIN AC) 100-10 mg/5 mL solution Comments:   Reason for Stopping:         losartan-hydrochlorothiazide (HYZAAR) 50-12.5 mg per tablet Comments:   Reason for Stopping: Follow-up Appointments:   1.  Your PCP: Nicolas Sherman MD, within 7-10 days    >30 minutes spent coordinating this discharge (review instructions/follow-up, prescriptions, preparing report for sign off)    Signed:  Jacqueline Andrade MD  7/10/2017  1:37 PM

## 2017-07-11 ENCOUNTER — PATIENT OUTREACH (OUTPATIENT)
Dept: INTERNAL MEDICINE CLINIC | Age: 82
End: 2017-07-11

## 2017-07-11 RX ORDER — ADHESIVE BANDAGE
30 BANDAGE TOPICAL DAILY PRN
COMMUNITY

## 2017-07-11 RX ORDER — FACIAL-BODY WIPES
10 EACH TOPICAL DAILY
COMMUNITY

## 2017-07-11 NOTE — PROGRESS NOTES
Transitions of Care Coordination    Latonia Marlow was admitted to [facility] -7/10/17 for dehydration, heat exhaustion, RANDALL, dementia and transferred to  [facility] . RRAT = 33    Portions of the Discharge Summary written by Dr. Peggy Hall on 7/10/17 are below in italics. Consults:    None.     Procedures:  None.     Significant Diagnostic Studies:   None. Discharge Diagnoses:                                           1. Dehydration, heat exhaustion, RANDALL  2. Rib contusion  3. Mechanical fall  4. Dementia  5. Ambulatory dysfxn  6. HypoK+  7. HTN  8. Chronic thrombocytopenia   9. Hx multiple DVT.    Hospital Course by Problem   1. Dehydration, heat exhaustion, RANDALL - resolved with fluids. Continue gentle hydration. No new issues. Principal reason for admission. 2. Rib contusion - no fx seen on plain films. Pain controlled. ICS. Continue Ultram as needed. Resulted from #3. 3. Mechanical fall - precautions for falls. Continue PT/OT. 4. Dementia - by hx. No acute. At baseline, as seen with family previously. 5. Ambulatory dysfxn - cannot ambulate safely. PT/OT efforts at SNF. 6. HypoK+ - repleted orally. 7. HTN - BPs elevated. Resumed outpt medications, increased HCTZ dose. Follow for effect. 8. Chronic thrombocytopenia - stable. No acute issues. 9. Hx multiple DVT - noted. Last BLE U/S 2016 shows no DVT. Has hx of RLE and LLE DVT in the past.               Today's examination of the patient revealed:      Subjective:      No F/C, N/V, CP, SOB.  No c/o otherwise.      Objective:   VS:        Visit Vitals    /82    Pulse 78    Temp 98.6 °F (37 °C)    Resp 18    Ht 5' 8\" (1.727 m)    Wt 91.6 kg (202 lb)    SpO2 99%    BMI 30.71 kg/m2      Tmax/24hrs: Temp (24hrs), Av.2 °F (36.8 °C), Min:97.7 °F (36.5 °C), Max:98.6 °F (37 °C)     Call to  [facility]     Per inpatient CM notes the patient's daughter, Raeann Ingram, has requested assistance with Medicaid application. Beatrice Walker with [employer] was contacted to assist.  Both the patient and his wife carry diagnoses of dementia. Daughter is considering LTC for the patient and his wife. Call to [address] Reached nurse, Branden Garcia, and identified self/role. Per nurse patient is doing very well. No problems or concerns identified at this time. Medications reconciled. Will follow.

## 2017-07-11 NOTE — PROGRESS NOTES
HAROLDO received a telephone call from Kimberly Connolly from [address] [place] [address] [phone] . He stated that the patient and his spouse were scheduled for a LTC screening via [facility] 7/10/2017, however the patient was admitted to the hospital at that time. The patient's spouse was screened and the family was concerned for the patient's screening. HAROLDO discussed with [place] Cruz RN who stated the patient's [facility] screening was planned for completion today. HAROLDO relayed the information to Mr. Sacha Posada via [place].

## 2017-07-12 ENCOUNTER — PATIENT OUTREACH (OUTPATIENT)
Dept: INTERNAL MEDICINE CLINIC | Age: 82
End: 2017-07-12

## 2017-07-19 ENCOUNTER — PATIENT OUTREACH (OUTPATIENT)
Dept: INTERNAL MEDICINE CLINIC | Age: 82
End: 2017-07-19

## 2017-07-19 NOTE — PROGRESS NOTES
Community Care Team Documentation for Patient in [facility]     [facility]       to [facility], [facility], on 7/10/2017. Hospital Discharge diagnosis:  dehydration. SNF Attending Provider:  Sury Gomez    Anticipated discharge date from SNF: TBD      PCP : Suzen Mortimer, MD    Nurse Navigator: Iza Miller RN    CCT rounds conducted and updates provided by facility. Full Code, pt/ot/sp. , lives with spouse. Both with dementia. Dtr working on [facility]. [employer] assist with Medicaid bryan. Patient found sitting outside store in hot car, confused. Unknown how long patient was there. Treated at hospital and sent to rehab for PT/OT.        High Risk            25       Total Score        3 Relationship with PCP    2 Patient Living Status    4 More than 1 Admission in calendar year    16 Charlson Comorbidity Score        Criteria that do not apply:    Patient Length of Stay > 5    Patient Insurance is Medicare, Medicaid or Self Pay

## 2017-07-26 ENCOUNTER — PATIENT OUTREACH (OUTPATIENT)
Dept: INTERNAL MEDICINE CLINIC | Age: 82
End: 2017-07-26

## 2017-07-26 NOTE — Clinical Note
No dc date. Dispo unknown. Not safe to return home due to dementia and safety concerns. Unknown if patient will qualify for Medicaid.

## 2017-07-27 NOTE — PROGRESS NOTES
Community Care Team Documentation for Patient in [facility]     [facility] 7/10/2017       to [facility], [facility], on 7/10/2017. Hospital Discharge diagnosis:  dehydration. SNF Attending Provider:  Lucila Mares    Anticipated discharge date from SNF: TBD      PCP : Tyler Shannon MD    Nurse Navigator: Han Childs RN    CCT rounds conducted and updates provided by facility. Participating with therapy, progressing. Needs LTC placement. Not safe to return home with wife. Full Code, pt/ot/sp. , lives with spouse. Both with dementia. Dtr working on [facility]. [employer] assist with Medicaid bryan.         High Risk            25       Total Score        3 Relationship with PCP    2 Patient Living Status    4 More than 1 Admission in calendar year    16 Charlson Comorbidity Score        Criteria that do not apply:    Patient Length of Stay > 5    Patient Insurance is Medicare, Medicaid or Self Pay

## 2017-08-02 ENCOUNTER — PATIENT OUTREACH (OUTPATIENT)
Dept: INTERNAL MEDICINE CLINIC | Age: 82
End: 2017-08-02

## 2017-08-02 NOTE — PROGRESS NOTES
Community Care Team Documentation for Patient in [facility]     [facility]       to [facility], [facility], on 7/10/2017. Hospital Discharge diagnosis:  dehydration. SNF Attending Provider:  Haseeb Sweet    Anticipated discharge date from SNF: TBD      PCP : Demetris George MD    Nurse Navigator: Tammy Su RN    CCT rounds conducted and updates provided by facility. 6 more days of therapy, then transition to LTC. Full Code, pt/ot/sp. , lives with spouse. Both with dementia. Dtr working on [employer]. [employer] assist with Medicaid bryan. High Risk            21       Total Score        3 Has Seen PCP in Last 6 Months (Yes=3, No=0)    2 . Living with Significant Other. Assisted Living. LTAC. SNF. or   Rehab    16 Charlson Comorbidity Score (Age + Comorbid Conditions)        Criteria that do not apply:    Patient Length of Stay (>5 days = 3)    IP Visits Last 12 Months (1-3=4, 4=9, >4=11)    Pt.  Coverage (Medicare=5 , Medicaid, or Self-Pay=4)

## 2017-08-09 ENCOUNTER — PATIENT OUTREACH (OUTPATIENT)
Dept: INTERNAL MEDICINE CLINIC | Age: 82
End: 2017-08-09

## 2017-08-09 NOTE — PROGRESS NOTES
Community Care Team Documentation for Patient in [facility]     Patient discharged from   [facility] 7/4/2017 7/10/2017       to [facility], [facility], on 7/10/2017. Hospital Discharge diagnosis:  dehydration. SNF Attending Provider:  Vanessa Fuentes    Anticipated discharge date from SNF: TBD      PCP : Melissa Vides MD    Nurse Navigator: Capri Navarro RN    CCT rounds conducted and updates provided by facility. Last skilled day 8/8/2017. Now LTC at facility. Patient has completed 30 day post hospitalization without readmission. Will close. Full Code    High Risk            21       Total Score        3 Has Seen PCP in Last 6 Months (Yes=3, No=0)    2 . Living with Significant Other. Assisted Living. LTAC. SNF. or   Rehab    16 Charlson Comorbidity Score (Age + Comorbid Conditions)        Criteria that do not apply:    Patient Length of Stay (>5 days = 3)    IP Visits Last 12 Months (1-3=4, 4=9, >4=11)    Pt.  Coverage (Medicare=5 , Medicaid, or Self-Pay=4)

## 2017-08-09 NOTE — Clinical Note
Officially LTC at [address] [facility]. PCP changed in EMR. 30 day YAMILETH completed and have closed episode.